# Patient Record
Sex: MALE | Race: BLACK OR AFRICAN AMERICAN | NOT HISPANIC OR LATINO | URBAN - METROPOLITAN AREA
[De-identification: names, ages, dates, MRNs, and addresses within clinical notes are randomized per-mention and may not be internally consistent; named-entity substitution may affect disease eponyms.]

---

## 2020-11-05 ENCOUNTER — EMERGENCY (EMERGENCY)
Facility: HOSPITAL | Age: 65
LOS: 1 days | Discharge: HOME | End: 2020-11-05
Attending: EMERGENCY MEDICINE | Admitting: EMERGENCY MEDICINE
Payer: COMMERCIAL

## 2020-11-05 VITALS
SYSTOLIC BLOOD PRESSURE: 180 MMHG | TEMPERATURE: 99 F | HEART RATE: 69 BPM | DIASTOLIC BLOOD PRESSURE: 68 MMHG | RESPIRATION RATE: 16 BRPM | OXYGEN SATURATION: 100 %

## 2020-11-05 LAB
ALBUMIN SERPL ELPH-MCNC: 4.4 G/DL — SIGNIFICANT CHANGE UP (ref 3.3–5)
ALP SERPL-CCNC: 53 U/L — SIGNIFICANT CHANGE UP (ref 40–120)
ALT FLD-CCNC: 16 U/L — SIGNIFICANT CHANGE UP (ref 4–41)
ANION GAP SERPL CALC-SCNC: 10 MMO/L — SIGNIFICANT CHANGE UP (ref 7–14)
AST SERPL-CCNC: 15 U/L — SIGNIFICANT CHANGE UP (ref 4–40)
BASOPHILS # BLD AUTO: 0.02 K/UL — SIGNIFICANT CHANGE UP (ref 0–0.2)
BASOPHILS NFR BLD AUTO: 0.4 % — SIGNIFICANT CHANGE UP (ref 0–2)
BILIRUB SERPL-MCNC: 0.2 MG/DL — SIGNIFICANT CHANGE UP (ref 0.2–1.2)
BUN SERPL-MCNC: 21 MG/DL — SIGNIFICANT CHANGE UP (ref 7–23)
CALCIUM SERPL-MCNC: 9.9 MG/DL — SIGNIFICANT CHANGE UP (ref 8.4–10.5)
CHLORIDE SERPL-SCNC: 101 MMOL/L — SIGNIFICANT CHANGE UP (ref 98–107)
CO2 SERPL-SCNC: 27 MMOL/L — SIGNIFICANT CHANGE UP (ref 22–31)
CREAT SERPL-MCNC: 1 MG/DL — SIGNIFICANT CHANGE UP (ref 0.5–1.3)
EOSINOPHIL # BLD AUTO: 0.17 K/UL — SIGNIFICANT CHANGE UP (ref 0–0.5)
EOSINOPHIL NFR BLD AUTO: 3.1 % — SIGNIFICANT CHANGE UP (ref 0–6)
GLUCOSE SERPL-MCNC: 256 MG/DL — HIGH (ref 70–99)
HBA1C BLD-MCNC: 10.3 % — HIGH (ref 4–5.6)
HCT VFR BLD CALC: 41 % — SIGNIFICANT CHANGE UP (ref 39–50)
HGB BLD-MCNC: 13.3 G/DL — SIGNIFICANT CHANGE UP (ref 13–17)
IMM GRANULOCYTES NFR BLD AUTO: 0.2 % — SIGNIFICANT CHANGE UP (ref 0–1.5)
LYMPHOCYTES # BLD AUTO: 1.91 K/UL — SIGNIFICANT CHANGE UP (ref 1–3.3)
LYMPHOCYTES # BLD AUTO: 35.1 % — SIGNIFICANT CHANGE UP (ref 13–44)
MCHC RBC-ENTMCNC: 25.9 PG — LOW (ref 27–34)
MCHC RBC-ENTMCNC: 32.4 % — SIGNIFICANT CHANGE UP (ref 32–36)
MCV RBC AUTO: 79.9 FL — LOW (ref 80–100)
MONOCYTES # BLD AUTO: 0.32 K/UL — SIGNIFICANT CHANGE UP (ref 0–0.9)
MONOCYTES NFR BLD AUTO: 5.9 % — SIGNIFICANT CHANGE UP (ref 2–14)
NEUTROPHILS # BLD AUTO: 3.01 K/UL — SIGNIFICANT CHANGE UP (ref 1.8–7.4)
NEUTROPHILS NFR BLD AUTO: 55.3 % — SIGNIFICANT CHANGE UP (ref 43–77)
NRBC # FLD: 0 K/UL — SIGNIFICANT CHANGE UP (ref 0–0)
PLATELET # BLD AUTO: 186 K/UL — SIGNIFICANT CHANGE UP (ref 150–400)
PMV BLD: 11.5 FL — SIGNIFICANT CHANGE UP (ref 7–13)
POTASSIUM SERPL-MCNC: 4.6 MMOL/L — SIGNIFICANT CHANGE UP (ref 3.5–5.3)
POTASSIUM SERPL-SCNC: 4.6 MMOL/L — SIGNIFICANT CHANGE UP (ref 3.5–5.3)
PROT SERPL-MCNC: 7.7 G/DL — SIGNIFICANT CHANGE UP (ref 6–8.3)
RBC # BLD: 5.13 M/UL — SIGNIFICANT CHANGE UP (ref 4.2–5.8)
RBC # FLD: 13.8 % — SIGNIFICANT CHANGE UP (ref 10.3–14.5)
SARS-COV-2 RNA SPEC QL NAA+PROBE: SIGNIFICANT CHANGE UP
SODIUM SERPL-SCNC: 138 MMOL/L — SIGNIFICANT CHANGE UP (ref 135–145)
TROPONIN T, HIGH SENSITIVITY: 14 NG/L — SIGNIFICANT CHANGE UP (ref ?–14)
TROPONIN T, HIGH SENSITIVITY: 14 NG/L — SIGNIFICANT CHANGE UP (ref ?–14)
WBC # BLD: 5.44 K/UL — SIGNIFICANT CHANGE UP (ref 3.8–10.5)
WBC # FLD AUTO: 5.44 K/UL — SIGNIFICANT CHANGE UP (ref 3.8–10.5)

## 2020-11-05 PROCEDURE — 70496 CT ANGIOGRAPHY HEAD: CPT | Mod: 26

## 2020-11-05 PROCEDURE — 70498 CT ANGIOGRAPHY NECK: CPT | Mod: 26

## 2020-11-05 PROCEDURE — 99220: CPT

## 2020-11-05 RX ORDER — SODIUM CHLORIDE 9 MG/ML
1000 INJECTION, SOLUTION INTRAVENOUS
Refills: 0 | Status: DISCONTINUED | OUTPATIENT
Start: 2020-11-05 | End: 2020-11-09

## 2020-11-05 RX ORDER — ASPIRIN/CALCIUM CARB/MAGNESIUM 324 MG
81 TABLET ORAL DAILY
Refills: 0 | Status: DISCONTINUED | OUTPATIENT
Start: 2020-11-05 | End: 2020-11-09

## 2020-11-05 RX ORDER — INSULIN GLARGINE 100 [IU]/ML
20 INJECTION, SOLUTION SUBCUTANEOUS AT BEDTIME
Refills: 0 | Status: DISCONTINUED | OUTPATIENT
Start: 2020-11-06 | End: 2020-11-09

## 2020-11-05 RX ORDER — DEXTROSE 50 % IN WATER 50 %
25 SYRINGE (ML) INTRAVENOUS ONCE
Refills: 0 | Status: DISCONTINUED | OUTPATIENT
Start: 2020-11-05 | End: 2020-11-09

## 2020-11-05 RX ORDER — INSULIN LISPRO 100/ML
VIAL (ML) SUBCUTANEOUS AT BEDTIME
Refills: 0 | Status: DISCONTINUED | OUTPATIENT
Start: 2020-11-06 | End: 2020-11-09

## 2020-11-05 RX ORDER — LISINOPRIL 2.5 MG/1
10 TABLET ORAL AT BEDTIME
Refills: 0 | Status: DISCONTINUED | OUTPATIENT
Start: 2020-11-05 | End: 2020-11-09

## 2020-11-05 RX ORDER — ATENOLOL 25 MG/1
25 TABLET ORAL AT BEDTIME
Refills: 0 | Status: DISCONTINUED | OUTPATIENT
Start: 2020-11-05 | End: 2020-11-09

## 2020-11-05 RX ORDER — INSULIN LISPRO 100/ML
6 VIAL (ML) SUBCUTANEOUS
Refills: 0 | Status: DISCONTINUED | OUTPATIENT
Start: 2020-11-05 | End: 2020-11-09

## 2020-11-05 RX ORDER — DEXTROSE 50 % IN WATER 50 %
12.5 SYRINGE (ML) INTRAVENOUS ONCE
Refills: 0 | Status: DISCONTINUED | OUTPATIENT
Start: 2020-11-05 | End: 2020-11-09

## 2020-11-05 RX ORDER — GLUCAGON INJECTION, SOLUTION 0.5 MG/.1ML
1 INJECTION, SOLUTION SUBCUTANEOUS ONCE
Refills: 0 | Status: DISCONTINUED | OUTPATIENT
Start: 2020-11-05 | End: 2020-11-09

## 2020-11-05 RX ORDER — INSULIN GLARGINE 100 [IU]/ML
20 INJECTION, SOLUTION SUBCUTANEOUS ONCE
Refills: 0 | Status: COMPLETED | OUTPATIENT
Start: 2020-11-05 | End: 2020-11-05

## 2020-11-05 RX ORDER — DEXTROSE 50 % IN WATER 50 %
15 SYRINGE (ML) INTRAVENOUS ONCE
Refills: 0 | Status: DISCONTINUED | OUTPATIENT
Start: 2020-11-05 | End: 2020-11-09

## 2020-11-05 RX ORDER — INSULIN LISPRO 100/ML
VIAL (ML) SUBCUTANEOUS
Refills: 0 | Status: DISCONTINUED | OUTPATIENT
Start: 2020-11-05 | End: 2020-11-09

## 2020-11-05 RX ORDER — ACETAMINOPHEN 500 MG
975 TABLET ORAL ONCE
Refills: 0 | Status: COMPLETED | OUTPATIENT
Start: 2020-11-05 | End: 2020-11-05

## 2020-11-05 RX ADMIN — LISINOPRIL 10 MILLIGRAM(S): 2.5 TABLET ORAL at 22:06

## 2020-11-05 RX ADMIN — ATENOLOL 25 MILLIGRAM(S): 25 TABLET ORAL at 22:06

## 2020-11-05 RX ADMIN — Medication 975 MILLIGRAM(S): at 15:05

## 2020-11-05 NOTE — ED PROVIDER NOTE - OBJECTIVE STATEMENT
66 y/o male with pmhx of DM on insulin, HTN presents to ED c/o headache x 3 days. States he developed a gradual onset left sided throbbing headache 3 days ago that has been constant since, currently 6/10. Associated with a 30 minute episode of "dizziness that felt like I couldn't concentrate," generalized weakness and slurred speech 3 days ago. Denies room spinning. Witnessed by family, called EMS 3 days ago. States did not end up going to hospital because EMS said his ekg was fine. Pt denies fever, cp, sob, n/v, weakness, numbness, tingling, facial droop, ataxia. Pt takes aspirin daily.

## 2020-11-05 NOTE — ED PROVIDER NOTE - ATTENDING CONTRIBUTION TO CARE
I have seen and examined the patient on the patient´s visit date. I have reviewed the note written by Nataly Mendez [facility], on that visit day. I have supervised and participated as necessary in the performance of procedures indicated for patient management and was available at all phases of the patient´s visit when needed. We discussed the history, physical exam findings, mnagement plan, and  medical decision making. I have made my additons, exceptions, and revisions within the chart and I agree with H and P as documented in its entirety. The data and my interpretation of any data collected from labs, interventions and imaging appear below as well as my independent medical decision making and considerations    The patient is a 65y Male who has a past medical and surgery history of HTN Diabetes mellitus and no significant past surgical history PTED with throbbing HA associated with blurred vision and clouded sensorium as described   Vital Signs Last 24 Hrs  T(F): 98.9 HR: 60 BP: 165/73 RR: 18 SpO2: 100% (05 Nov 2020 19:01) (100% - 100%)  PE: as described; my additions and exceptions are noted in the chart  DATA:  EKG: See above;  LAB:                        13.3   5.44  )-----------( 186      ( 05 Nov 2020 15:00 )             41.0   Mean Cell Volume: 79.9 fL (11-05-20 @ 15:00)  Auto Neutrophil %: 55.3 % (11-05-20 @ 15:00)  Auto Eosinophil %: 3.1 % (11-05-20 @ 15:00)  11-05    138  |  101  |  21  ----------------------------<  256<H>  4.6   |  27  |  1.00    Ca    9.9      05 Nov 2020 15:00    TPro  7.7  /  Alb  4.4  /  TBili  0.2  /  DBili  x   /  AST  15  /  ALT  16  /  AlkPhos  53  11-05      Troponin T, High Sensitivity: 14 ng/L (11-05-20 @ 16:19)    IMAGING: CT of the head demonstrates that the ventricles and sulci to be normal in appearance. No intracranial mass effect or hemorrhage is seen. No areas of abnormal attenuation or abnormal enhancement are seen.  No occlusion, significant stenosis or other vascular abnormality identified.    IMPRESSION/RISK:  Dx=CVA vs anxiety  Plan  Neuro consult read/appreciated  - as CTA negative will d/c with ASA as recommended to f/u with Vascular Neurology, Dr. Marquez at [phone]  RTEDPRN I have seen and examined the patient on the patient´s visit date. I have reviewed the note written by Nataly Mendez [facility], on that visit day. I have supervised and participated as necessary in the performance of procedures indicated for patient management and was available at all phases of the patient´s visit when needed. We discussed the history, physical exam findings, mnagement plan, and  medical decision making. I have made my additons, exceptions, and revisions within the chart and I agree with H and P as documented in its entirety. The data and my interpretation of any data collected from labs, interventions and imaging appear below as well as my independent medical decision making and considerations    The patient is a 65y Male who has a past medical and surgery history of HTN Diabetes mellitus and no significant past surgical history PTED with throbbing HA associated with blurred vision and clouded sensorium as described   Vital Signs Last 24 Hrs  T(F): 98.9 HR: 60 BP: 165/73 RR: 18 SpO2: 100% (05 Nov 2020 19:01) (100% - 100%)  PE: as described; my additions and exceptions are noted in the chart  DATA:  EKG: See above;  LAB:                        13.3   5.44  )-----------( 186      ( 05 Nov 2020 15:00 )             41.0   Mean Cell Volume: 79.9 fL (11-05-20 @ 15:00)  Auto Neutrophil %: 55.3 % (11-05-20 @ 15:00)  Auto Eosinophil %: 3.1 % (11-05-20 @ 15:00)  11-05    138  |  101  |  21  ----------------------------<  256<H>  4.6   |  27  |  1.00    Ca    9.9      05 Nov 2020 15:00    TPro  7.7  /  Alb  4.4  /  TBili  0.2  /  DBili  x   /  AST  15  /  ALT  16  /  AlkPhos  53  11-05      Troponin T, High Sensitivity: 14 ng/L (11-05-20 @ 16:19)    IMAGING: CT of the head demonstrates that the ventricles and sulci to be normal in appearance. No intracranial mass effect or hemorrhage is seen. No areas of abnormal attenuation or abnormal enhancement are seen.  No occlusion, significant stenosis or other vascular abnormality identified.    IMPRESSION/RISK:  Dx=CVA vs anxiety  Plan  Neuro consult read/appreciated  - as CTA negative will d/c with ASA as recommended to f/u with Vascular Neurology, Dr. Marquez at [phone] if improved with instructions to   RTEDPRN  Otherwise will hold for MRI/a I have seen and examined the patient on the patient´s visit date. I have reviewed the note written by Nataly Mendez [facility], on that visit day. I have supervised and participated as necessary in the performance of procedures indicated for patient management and was available at all phases of the patient´s visit when needed. We discussed the history, physical exam findings, management plan, and  medical decision making. I have made my additions, exceptions, and revisions within the chart and I agree with H and P as documented in its entirety. The data and my interpretation of any data collected from labs, interventions and imaging appear below as well as my independent medical decision making and considerations    The patient is a 65y Male who has a past medical and surgery history of HTN Diabetes mellitus and no significant past surgical history PTED with throbbing HA associated with blurred vision and clouded sensorium as described   Vital Signs Last 24 Hrs  T(F): 98.9 HR: 60 BP: 165/73 RR: 18 SpO2: 100% (05 Nov 2020 19:01) (100% - 100%)  PE: as described; my additions and exceptions are noted in the chart  DATA:  EKG: See above;  LAB: See relevant/abnormal labs in pullset                        13.3   5.44  )-----------( 186      ( 05 Nov 2020 15:00 )             41.0   Mean Cell Volume: 79.9 fL (11-05-20 @ 15:00)  Auto Neutrophil %: 55.3 % (11-05-20 @ 15:00)  Auto Eosinophil %: 3.1 % (11-05-20 @ 15:00)  11-05    138  |  101  |  21  ----------------------------<  256<H>  4.6   |  27  |  1.00    Ca    9.9      05 Nov 2020 15:00    TPro  7.7  /  Alb  4.4  /  TBili  0.2  /  DBili  x   /  AST  15  /  ALT  16  /  AlkPhos  53  11-05      Troponin T, High Sensitivity: 14 ng/L (11-05-20 @ 16:19)    IMAGING: CT of the head demonstrates that the ventricles and sulci to be normal in appearance. No intracranial mass effect or hemorrhage is seen. No areas of abnormal attenuation or abnormal enhancement are seen.  No occlusion, significant stenosis or other vascular abnormality identified.    IMPRESSION/RISK:  Dx=CVA vs anxiety  Plan  Neuro consult read/appreciated  - as CTA negative will d/c with ASA as recommended to f/u with Vascular Neurology, Dr. Marquez at [phone] if improved with instructions to   RTEDPRN  Otherwise will hold for MRI/a

## 2020-11-05 NOTE — ED CDU PROVIDER INITIAL DAY NOTE - MEDICAL DECISION MAKING DETAILS
Tele monitoring, neuro checks, MRI head, supportive care, general observation care / monitoring; Neuro team is following patient.

## 2020-11-05 NOTE — ED CDU PROVIDER INITIAL DAY NOTE - ATTENDING CONTRIBUTION TO CARE
I have seen and examined the patient on the patient´s visit date. I have reviewed the note written by Brissa Galvez MD on that visit day. I have supervised and participated as necessary in the performance of procedures indicated for patient management and was available at all phases of the patient´s visit when needed. We discussed the history, physical exam findings, mnagement plan, and  medical decision making. I have made my additons, exceptions, and revisions within the chart and I agree with H and P as documented in its entirety. The data and my interpretation of any data collected from labs, interventions and imaging appear below as well as my independent medical decision making and considerations    The patient is a 65y Male who has a past medical and surgery history of  Hyperlipidemia Diabetes mellitus Hypertension PTED with left sided headache and transient cognitive dysfunction requiring futther diagnostic workup as outlined above

## 2020-11-05 NOTE — ED CDU PROVIDER INITIAL DAY NOTE - PHYSICAL EXAMINATION
CONSTITUTIONAL:  Well appearing, awake, alert, oriented to person, place, time/situation and in no apparent distress.  Pt. is objectively comfortable appearing and verbalizing in full, clear, effortless sentences.  ENMT: NC/AT.  Airway patent.  Nasal mucosa clear.  Moist mucous membranes.  Neck supple.  EYES:  Clear OU.  CARDIAC:  Normal rate, regular rhythm.  Heart sounds S1 S2.  No murmurs, gallops, or rubs.  RESPIRATORY:  Breath sounds clear and equal bilaterally.  No wheezes, no rales, no rhonchi.  GASTROINTESTINAL:  Abdomen soft, non-distended, non-tender.  No rebound, no guarding.  NEUROLOGICAL:  Alert and oriented to person/place/time/situation.  No focal deficits; no motor deficits.  No tremors noted.  Gait is stable, unassisted.  MUSCULOSKELETAL:  Range of motion is not limited.    SKIN:  Skin color unremarkable.  Skin warm, dry, and intact.    PSYCHIATRIC:  Alert and oriented to person/place/time/situation.  Mood and affect WNL.  No apparent risk to self or others.

## 2020-11-05 NOTE — ED ADULT NURSE NOTE - OBJECTIVE STATEMENT
PT received AOx4, ambulatory at baseline presents to the ED with chief complaint of headache, dizziness, and lethargy that started 2 days ago. PT states that he came home from work was sitting in front of his computer when all of a sudden he felt dizzy. PT denies trauma to the head. Pt denies falling. PT states at the moment, the dizziness has resolved but he still has 3-4/10 headache mostly located in the back of the head. Pt denies CP, SOB, NVD, dysuria, chills, fever, cough. Breathing even and unlabored. Skin intact. Medicated per MD order. Labs sent.

## 2020-11-05 NOTE — ED PROVIDER NOTE - PROGRESS NOTE DETAILS
FAYE Mendez- called neurology, states in code stroke asking for me to callback FAYE Mendez- spoke with neuro resident, states will not see patient until cta is performed. I expressed my clinical concern for cva and currently refusing to see patient. FAYE Mendez- spoke with neuro resident, states will not see patient until cta is performed. I expressed my clinical concern for cva. FAYE Mendez- pt accepted to cdu

## 2020-11-05 NOTE — CONSULT NOTE ADULT - SUBJECTIVE AND OBJECTIVE BOX
HPI: 65 year old female, PMH DM, HTN, presenting for persistent HA for 3 days duration. Starting 3 days ago while sitting down and working on his computer, patient started feeling dizzy (non-room spinning), slurred speech, and a HA in the L parietal and frontal regions. The dizziness and slurred speech, according to the patient lasted 2-3 hours before resolving. The patient still had the HA symptoms, which was a 9/10 in intensity. Upon on my encounter, patient states his HA is at a 2/10. The HA is not a/w photophobia or worsening with position. Denies watery eyes or runny nose. Denies ringing/fullness in the ears, trouble w/ speech/swallowing, numbness/tingling/weakness. No h/o migraines. Not on AC or DAPT. Patient is only on medications for HTN and DM. Of note, when the symptoms started 3 days ago, EMS was called to evaluate him but did not feel he needed to go to the ED. NIH: 0, MRS: 0    REVIEW OF SYSTEMS  As per HPI    PAST MEDICAL & SURGICAL HISTORY:  Diabetes mellitus    Hypertension    No significant past surgical history      FAMILY HISTORY:    SOCIAL HISTORY:   T/E/D:   Occupation:   Lives with:     MEDICATIONS (HOME):  Home Medications:    MEDICATIONS  (STANDING):    MEDICATIONS  (PRN):    ALLERGIES/INTOLERANCES:  Allergies  Cipro (Rash)    Intolerances    VITALS & EXAMINATION:  Vital Signs Last 24 Hrs  T(C): 37 (05 Nov 2020 13:32), Max: 37 (05 Nov 2020 13:32)  T(F): 98.6 (05 Nov 2020 13:32), Max: 98.6 (05 Nov 2020 13:32)  HR: 69 (05 Nov 2020 13:32) (69 - 69)  BP: 180/68 (05 Nov 2020 13:32) (180/68 - 180/68)  BP(mean): --  RR: 16 (05 Nov 2020 13:32) (16 - 16)  SpO2: 100% (05 Nov 2020 13:32) (100% - 100%)    General:  Constitutional: Male, appears stated age, in no apparent distress including pain  Head: Normocephalic & atraumatic.    Neurological (>12):  MS: Awake, alert, oriented to person, place, situation, time. Normal affect. Follows all commands.    Language: Speech is clear, fluent with good repetition & comprehension    CNs: PERRLA (R = 3mm, L = 3mm). VFF. EOMI no nystagmus, no diplopia. V1-3 intact to LT, well developed masseter muscles b/l. No facial asymmetry b/l, full eye closure strength b/l. Hearing grossly normal (rubbing fingers) b/l. Symmetric palate elevation in midline. Gag reflex deferred. Head turning & shoulder shrug intact b/l. Tongue midline, normal movements, no atrophy.    Motor: Normal muscle bulk & tone. No noticeable tremor or seizure. No pronator drift.    Sensation: Intact to LT b/l throughout.     Cortical: Extinction on DSS (neglect): none    Reflexes:              Patellar(L4)    Achilles(S1)    Plantar Resp  R	2		    2		Down   L          2		    2		Down     Coordination: No dysmetria to FTN    Gait: No postural instability. Normal stance and tandem gait.     LABORATORY:  CBC                       13.3   5.44  )-----------( 186      ( 05 Nov 2020 15:00 )             41.0     Chem 11-05    138  |  101  |  21  ----------------------------<  256<H>  4.6   |  27  |  1.00    Ca    9.9      05 Nov 2020 15:00    TPro  7.7  /  Alb  4.4  /  TBili  0.2  /  DBili  x   /  AST  15  /  ALT  16  /  AlkPhos  53  11-05    LFTs LIVER FUNCTIONS - ( 05 Nov 2020 15:00 )  Alb: 4.4 g/dL / Pro: 7.7 g/dL / ALK PHOS: 53 u/L / ALT: 16 u/L / AST: 15 u/L / GGT: x           Coagulopathy   Lipid Panel   A1c   Cardiac enzymes     U/A   CSF  Immunological  Other    STUDIES & IMAGING:  Studies (EKG, EEG, EMG, etc):     Radiology (XR, CT, MR, U/S, TTE/QUIRINO):  Imaging pending

## 2020-11-05 NOTE — ED ADULT TRIAGE NOTE - CHIEF COMPLAINT QUOTE
C/o posterior HA x2 days. Reports additional episode of dizziness 2 days ago which resolved, evaluated by EMS at that time and told VS and EKG nml. Denies CP/ SOB/ cough/f/c/N/V/D. PMH HTN DMII.

## 2020-11-05 NOTE — ED PROVIDER NOTE - CLINICAL SUMMARY MEDICAL DECISION MAKING FREE TEXT BOX
64 y/o male with pmhx of DM on insulin, HTN presents to ED c/o headache x 3 days. States he developed a gradual onset left sided throbbing headache 3 days ago that has been constant since, currently 6/10. Associated with a 30 minute episode of "dizziness that felt like I couldn't concentrate," generalized weakness and slurred speech 3 days ago. On aspirin daily. pt well appearing, nad, no neuro deficits. concern for CVA vs TIA. plan to check cta head and neck. amenable for cdu stay for MRI and neuro.

## 2020-11-05 NOTE — ED PROVIDER NOTE - CARE PROVIDER_API CALL
Sawyer Marquez  NEUROLOGY  [address] [facility] [address] [facility] [address]  [place] [address]  Phone: [phone]  Fax: [phone]  Established Patient  Follow Up Time:

## 2020-11-05 NOTE — ED PROVIDER NOTE - CARE PLAN
Principal Discharge DX:	Other vascular headache  Secondary Diagnosis:	Type 2 diabetes mellitus without complication, unspecified whether long term insulin use  Secondary Diagnosis:	Essential hypertension

## 2020-11-05 NOTE — ED CDU PROVIDER INITIAL DAY NOTE - INDICATION FOR OBSERVATION
Tele monitoring, neuro checks, MRI head, supportive care, general observation care / monitoring; Neuro team is following patient./Diagnostic Uncertainty/Other

## 2020-11-05 NOTE — CONSULT NOTE ADULT - ASSESSMENT
Impression: 65 year old female, PMH DM, HTN, presenting for persistent HA for 3 days duration. Starting 3 days ago while sitting down and working on his computer, patient started feeling dizzy (non-room spinning), slurred speech, and a HA in the L parietal and frontal regions. The dizziness and slurred speech, according to the patient lasted 2-3 hours before resolving. Headache also with transient dizziness and slurred speech, now resolved of unclear etiology - possibly in setting of hypertensive emergency vs TIA vs minor stroke     Recommend:  - will f/u CTH and CTA H/N  - if imaging is negative for acute bleed would start Aspirin 81mg daily if no contraindications  - would defer Plavix at this time given symptom onset > 3d   - if headache improves, can follow up outpatient with Vascular Neurology, Dr. Marquez at [phone]

## 2020-11-06 VITALS
SYSTOLIC BLOOD PRESSURE: 149 MMHG | RESPIRATION RATE: 14 BRPM | DIASTOLIC BLOOD PRESSURE: 70 MMHG | HEART RATE: 59 BPM | TEMPERATURE: 97 F | OXYGEN SATURATION: 98 %

## 2020-11-06 LAB
CHOLEST SERPL-MCNC: 228 MG/DL — HIGH (ref 120–199)
DIRECT LDL: 185 MG/DL — SIGNIFICANT CHANGE UP
HDLC SERPL-MCNC: 42 MG/DL — SIGNIFICANT CHANGE UP (ref 35–55)
TRIGL SERPL-MCNC: 70 MG/DL — SIGNIFICANT CHANGE UP (ref 10–149)

## 2020-11-06 PROCEDURE — 70551 MRI BRAIN STEM W/O DYE: CPT | Mod: 26

## 2020-11-06 PROCEDURE — 99217: CPT

## 2020-11-06 RX ORDER — CLOPIDOGREL BISULFATE 75 MG/1
1 TABLET, FILM COATED ORAL
Qty: 30 | Refills: 1
Start: 2020-11-06 | End: 2021-01-04

## 2020-11-06 RX ORDER — CLOPIDOGREL BISULFATE 75 MG/1
300 TABLET, FILM COATED ORAL ONCE
Refills: 0 | Status: COMPLETED | OUTPATIENT
Start: 2020-11-06 | End: 2020-11-06

## 2020-11-06 RX ORDER — ASPIRIN/CALCIUM CARB/MAGNESIUM 324 MG
1 TABLET ORAL
Qty: 30 | Refills: 1
Start: 2020-11-06 | End: 2021-01-04

## 2020-11-06 RX ADMIN — Medication 81 MILLIGRAM(S): at 09:45

## 2020-11-06 RX ADMIN — INSULIN GLARGINE 20 UNIT(S): 100 INJECTION, SOLUTION SUBCUTANEOUS at 00:01

## 2020-11-06 RX ADMIN — Medication 1: at 12:51

## 2020-11-06 RX ADMIN — Medication 6 UNIT(S): at 09:46

## 2020-11-06 RX ADMIN — CLOPIDOGREL BISULFATE 300 MILLIGRAM(S): 75 TABLET, FILM COATED ORAL at 12:55

## 2020-11-06 RX ADMIN — Medication 6 UNIT(S): at 12:51

## 2020-11-06 NOTE — ED CDU PROVIDER SUBSEQUENT DAY NOTE - ATTENDING CONTRIBUTION TO CARE
See History of Present Illness for attending note. See History of Present Illness for attending note..

## 2020-11-06 NOTE — ED CDU PROVIDER DISPOSITION NOTE - NSFOLLOWUPINSTRUCTIONS_ED_ALL_ED_FT
You came to the ED with left-sided headaches x 3 days with dizziness and slurred speech, which resolved after a few hours.  We were concerned for a stroke so we performed at CT angiogram of your head and neck which did not show any acute findings. We also performed a MRI of the brain which also did not show any acute findings.  Neurology is recommending you start aspirin and plavix. Both of these medications decreased your platlets from forming but also increase your risk for bleeding.  Please follow up with your primary care physician.  Please also follow up with Vascular Neurology, Dr. Marquez at [phone]. Call to arrange an appointment.    Advance activity as tolerated.  Continue all previously prescribed medications as directed unless otherwise instructed.  Follow up with your primary care physician in 48-72 hours- bring copies of your results.  Return to the ER for worsening or persistent symptoms, and/or ANY NEW OR CONCERNING SYMPTOMS. If you have issues obtaining follow up, please call: [phone] to obtain a doctor or specialist who takes your insurance in your area.  You may call [phone] to make an appointment with the internal medicine clinic.

## 2020-11-06 NOTE — ED CDU PROVIDER SUBSEQUENT DAY NOTE - HISTORY
66 y/o M PMH DM, HTN, HLD presented to the ED c/o left-sided headaches x 3 days with dizziness and slurred speech, which resolved after a few hours. Neurology was consulted and CTA head/neck were performed with no acute findings noted.  Pt. was dispo'd to CDU for MRI head. Pt well appearing, without complaints, VSS, NAD

## 2020-11-06 NOTE — ED CDU PROVIDER DISPOSITION NOTE - CLINICAL COURSE
64 yo male, PMH DM, HTN, hyperlipidemia; pt presented to the ED c/o left-sided headaches x 3 days with dizziness and slurred speech, which resolved after a few hours. Neurology was consulted and CTA head/neck were performed with no acute findings noted.  Pt. was dispo'd to CDU for MRI head which did not show any acute findings. Pt well appearing, without complaints, VSS, NAD.

## 2020-11-06 NOTE — ED CDU PROVIDER DISPOSITION NOTE - PATIENT PORTAL LINK FT
You can access the FollowMyHealth Patient Portal offered by [facility] by registering at the following website: [email or web address]. By joining [facility]’s FollowMyHealth portal, you will also be able to view your health information using other applications (apps) compatible with our system.

## 2020-11-06 NOTE — ED CDU PROVIDER SUBSEQUENT DAY NOTE - MEDICAL DECISION MAKING DETAILS
MRI results pending today. 66 y/o M PMH DM, HTN, HLD presented to the ED c/o left-sided headaches x 3 days with dizziness and slurred speech, which resolved after a few hours. Neurology was consulted and CTA head/neck were performed with no acute findings noted.  Pt. was dispo'd to CDU for MRI head. Pt well appearing, without complaints, VSS, NAD  MRI results pending today.

## 2020-12-25 NOTE — ED CDU PROVIDER INITIAL DAY NOTE - OBJECTIVE STATEMENT
64 y/o male with pmhx of DM on insulin, HTN presents to ED c/o headache x 3 days. States he developed a gradual onset left sided throbbing headache 3 days ago that has been constant since, currently 6/10. Associated with a 30 minute episode of "dizziness that felt like I couldn't concentrate," generalized weakness and slurred speech 3 days ago. Denies room spinning. Witnessed by family, called EMS 3 days ago. States did not end up going to hospital because EMS said his ekg was fine. Pt denies fever, cp, sob, n/v, weakness, numbness, tingling, facial droop, ataxia. Pt takes aspirin daily.    CDU FAYE Galvez Note-----  ED Provider HPI as above, reviewe.  64 yo male, PMH DM, HTN, hyperlipidemia; pt presented to the ED c/o left-sided headaches x 3 days; pt stated at onset, headache was associated with dizziness and slurred speech, which resolved after a few hours; headache has been persistent since.  In the ED, CBC/CMP grossly unremarkable aside from serum glucose 256, trop 14 ---> 14.  Neurology was consulted and CTA head/neck were performed with no acute findings noted.  Pt. was dispo'd to CDU for continued care plan:  Tele monitoring, neuro checks, MRI head, supportive care, general observation care / monitoring; Neuro team is following patient.  On CDU evaluation, pt denies any active c/o.  Pt denies active headache, vision disturbances, dizziness, weakness (generalized or focal); pt states no other c/o.  CDU care plan d/w pt who verbalizes agreement with plan.
None known

## 2021-10-22 NOTE — ED ADULT NURSE NOTE - PAIN: PRESENCE, MLM
Patient declines to make appointment at this time.  She states she will wait to see specialist.     complains of pain/discomfort

## 2022-06-24 ENCOUNTER — INPATIENT (INPATIENT)
Facility: HOSPITAL | Age: 67
LOS: 2 days | Discharge: HOME | End: 2022-06-27
Attending: INTERNAL MEDICINE | Admitting: INTERNAL MEDICINE
Payer: COMMERCIAL

## 2022-06-24 VITALS
RESPIRATION RATE: 18 BRPM | TEMPERATURE: 98 F | SYSTOLIC BLOOD PRESSURE: 175 MMHG | HEART RATE: 68 BPM | OXYGEN SATURATION: 99 % | DIASTOLIC BLOOD PRESSURE: 82 MMHG

## 2022-06-24 DIAGNOSIS — I10 ESSENTIAL (PRIMARY) HYPERTENSION: ICD-10-CM

## 2022-06-24 DIAGNOSIS — E11.65 TYPE 2 DIABETES MELLITUS WITH HYPERGLYCEMIA: ICD-10-CM

## 2022-06-24 DIAGNOSIS — R07.9 CHEST PAIN, UNSPECIFIED: ICD-10-CM

## 2022-06-24 DIAGNOSIS — Z29.9 ENCOUNTER FOR PROPHYLACTIC MEASURES, UNSPECIFIED: ICD-10-CM

## 2022-06-24 PROBLEM — E78.5 HYPERLIPIDEMIA, UNSPECIFIED: Chronic | Status: ACTIVE | Noted: 2020-11-05

## 2022-06-24 LAB
ALBUMIN SERPL ELPH-MCNC: 4.5 G/DL — SIGNIFICANT CHANGE UP (ref 3.3–5)
ALP SERPL-CCNC: 50 U/L — SIGNIFICANT CHANGE UP (ref 40–120)
ALT FLD-CCNC: 18 U/L — SIGNIFICANT CHANGE UP (ref 4–41)
ANION GAP SERPL CALC-SCNC: 11 MMOL/L — SIGNIFICANT CHANGE UP (ref 7–14)
AST SERPL-CCNC: 25 U/L — SIGNIFICANT CHANGE UP (ref 4–40)
BASOPHILS # BLD AUTO: 0.02 K/UL — SIGNIFICANT CHANGE UP (ref 0–0.2)
BASOPHILS NFR BLD AUTO: 0.5 % — SIGNIFICANT CHANGE UP (ref 0–2)
BILIRUB SERPL-MCNC: 0.3 MG/DL — SIGNIFICANT CHANGE UP (ref 0.2–1.2)
BUN SERPL-MCNC: 21 MG/DL — SIGNIFICANT CHANGE UP (ref 7–23)
CALCIUM SERPL-MCNC: 9.2 MG/DL — SIGNIFICANT CHANGE UP (ref 8.4–10.5)
CHLORIDE SERPL-SCNC: 103 MMOL/L — SIGNIFICANT CHANGE UP (ref 98–107)
CO2 SERPL-SCNC: 29 MMOL/L — SIGNIFICANT CHANGE UP (ref 22–31)
CREAT SERPL-MCNC: 1.07 MG/DL — SIGNIFICANT CHANGE UP (ref 0.5–1.3)
EGFR: 77 ML/MIN/1.73M2 — SIGNIFICANT CHANGE UP
EOSINOPHIL # BLD AUTO: 0.14 K/UL — SIGNIFICANT CHANGE UP (ref 0–0.5)
EOSINOPHIL NFR BLD AUTO: 3.2 % — SIGNIFICANT CHANGE UP (ref 0–6)
FLUAV AG NPH QL: SIGNIFICANT CHANGE UP
FLUBV AG NPH QL: SIGNIFICANT CHANGE UP
GLUCOSE SERPL-MCNC: 189 MG/DL — HIGH (ref 70–99)
HCT VFR BLD CALC: 41.2 % — SIGNIFICANT CHANGE UP (ref 39–50)
HGB BLD-MCNC: 12.8 G/DL — LOW (ref 13–17)
IANC: 2.06 K/UL — SIGNIFICANT CHANGE UP (ref 1.8–7.4)
IMM GRANULOCYTES NFR BLD AUTO: 0.2 % — SIGNIFICANT CHANGE UP (ref 0–1.5)
LYMPHOCYTES # BLD AUTO: 1.75 K/UL — SIGNIFICANT CHANGE UP (ref 1–3.3)
LYMPHOCYTES # BLD AUTO: 40.3 % — SIGNIFICANT CHANGE UP (ref 13–44)
MAGNESIUM SERPL-MCNC: 1.9 MG/DL — SIGNIFICANT CHANGE UP (ref 1.6–2.6)
MCHC RBC-ENTMCNC: 26.3 PG — LOW (ref 27–34)
MCHC RBC-ENTMCNC: 31.1 GM/DL — LOW (ref 32–36)
MCV RBC AUTO: 84.6 FL — SIGNIFICANT CHANGE UP (ref 80–100)
MONOCYTES # BLD AUTO: 0.36 K/UL — SIGNIFICANT CHANGE UP (ref 0–0.9)
MONOCYTES NFR BLD AUTO: 8.3 % — SIGNIFICANT CHANGE UP (ref 2–14)
NEUTROPHILS # BLD AUTO: 2.06 K/UL — SIGNIFICANT CHANGE UP (ref 1.8–7.4)
NEUTROPHILS NFR BLD AUTO: 47.5 % — SIGNIFICANT CHANGE UP (ref 43–77)
NRBC # BLD: 0 /100 WBCS — SIGNIFICANT CHANGE UP
NRBC # FLD: 0 K/UL — SIGNIFICANT CHANGE UP
PLATELET # BLD AUTO: 160 K/UL — SIGNIFICANT CHANGE UP (ref 150–400)
POTASSIUM SERPL-MCNC: 5.3 MMOL/L — SIGNIFICANT CHANGE UP (ref 3.5–5.3)
POTASSIUM SERPL-SCNC: 5.3 MMOL/L — SIGNIFICANT CHANGE UP (ref 3.5–5.3)
PROT SERPL-MCNC: 7.4 G/DL — SIGNIFICANT CHANGE UP (ref 6–8.3)
RBC # BLD: 4.87 M/UL — SIGNIFICANT CHANGE UP (ref 4.2–5.8)
RBC # FLD: 13.7 % — SIGNIFICANT CHANGE UP (ref 10.3–14.5)
RSV RNA NPH QL NAA+NON-PROBE: SIGNIFICANT CHANGE UP
SARS-COV-2 RNA SPEC QL NAA+PROBE: SIGNIFICANT CHANGE UP
SODIUM SERPL-SCNC: 143 MMOL/L — SIGNIFICANT CHANGE UP (ref 135–145)
TROPONIN T, HIGH SENSITIVITY RESULT: 12 NG/L — SIGNIFICANT CHANGE UP
TROPONIN T, HIGH SENSITIVITY RESULT: 14 NG/L — SIGNIFICANT CHANGE UP
WBC # BLD: 4.34 K/UL — SIGNIFICANT CHANGE UP (ref 3.8–10.5)
WBC # FLD AUTO: 4.34 K/UL — SIGNIFICANT CHANGE UP (ref 3.8–10.5)

## 2022-06-24 PROCEDURE — 93010 ELECTROCARDIOGRAM REPORT: CPT

## 2022-06-24 PROCEDURE — 99284 EMERGENCY DEPT VISIT MOD MDM: CPT | Mod: 25

## 2022-06-24 PROCEDURE — 71046 X-RAY EXAM CHEST 2 VIEWS: CPT | Mod: 26

## 2022-06-24 PROCEDURE — 99223 1ST HOSP IP/OBS HIGH 75: CPT

## 2022-06-24 RX ORDER — HUMAN INSULIN 100 [IU]/ML
0 INJECTION, SUSPENSION SUBCUTANEOUS
Qty: 0 | Refills: 0 | DISCHARGE

## 2022-06-24 RX ORDER — LISINOPRIL 2.5 MG/1
1 TABLET ORAL
Qty: 0 | Refills: 0 | DISCHARGE

## 2022-06-24 RX ORDER — IBUPROFEN 200 MG
400 TABLET ORAL ONCE
Refills: 0 | Status: COMPLETED | OUTPATIENT
Start: 2022-06-24 | End: 2022-06-24

## 2022-06-24 RX ORDER — INSULIN GLARGINE 100 [IU]/ML
20 INJECTION, SOLUTION SUBCUTANEOUS ONCE
Refills: 0 | Status: COMPLETED | OUTPATIENT
Start: 2022-06-24 | End: 2022-06-24

## 2022-06-24 RX ORDER — ATENOLOL 25 MG/1
1 TABLET ORAL
Qty: 0 | Refills: 0 | DISCHARGE

## 2022-06-24 RX ORDER — INSULIN LISPRO 100/ML
VIAL (ML) SUBCUTANEOUS AT BEDTIME
Refills: 0 | Status: DISCONTINUED | OUTPATIENT
Start: 2022-06-24 | End: 2022-06-27

## 2022-06-24 RX ORDER — ENOXAPARIN SODIUM 100 MG/ML
40 INJECTION SUBCUTANEOUS EVERY 24 HOURS
Refills: 0 | Status: DISCONTINUED | OUTPATIENT
Start: 2022-06-24 | End: 2022-06-27

## 2022-06-24 RX ORDER — DEXTROSE 50 % IN WATER 50 %
25 SYRINGE (ML) INTRAVENOUS ONCE
Refills: 0 | Status: DISCONTINUED | OUTPATIENT
Start: 2022-06-24 | End: 2022-06-27

## 2022-06-24 RX ORDER — INSULIN LISPRO 100/ML
4 VIAL (ML) SUBCUTANEOUS
Refills: 0 | Status: DISCONTINUED | OUTPATIENT
Start: 2022-06-24 | End: 2022-06-25

## 2022-06-24 RX ORDER — INSULIN LISPRO 100/ML
VIAL (ML) SUBCUTANEOUS
Refills: 0 | Status: DISCONTINUED | OUTPATIENT
Start: 2022-06-24 | End: 2022-06-27

## 2022-06-24 RX ORDER — INSULIN GLARGINE 100 [IU]/ML
20 INJECTION, SOLUTION SUBCUTANEOUS AT BEDTIME
Refills: 0 | Status: DISCONTINUED | OUTPATIENT
Start: 2022-06-25 | End: 2022-06-27

## 2022-06-24 RX ORDER — LIDOCAINE 4 G/100G
1 CREAM TOPICAL ONCE
Refills: 0 | Status: COMPLETED | OUTPATIENT
Start: 2022-06-24 | End: 2022-06-25

## 2022-06-24 RX ORDER — ATENOLOL 25 MG/1
25 TABLET ORAL AT BEDTIME
Refills: 0 | Status: DISCONTINUED | OUTPATIENT
Start: 2022-06-24 | End: 2022-06-27

## 2022-06-24 RX ORDER — INSULIN LISPRO 100/ML
10 VIAL (ML) SUBCUTANEOUS
Qty: 0 | Refills: 0 | DISCHARGE

## 2022-06-24 RX ORDER — SODIUM CHLORIDE 9 MG/ML
1000 INJECTION, SOLUTION INTRAVENOUS
Refills: 0 | Status: DISCONTINUED | OUTPATIENT
Start: 2022-06-24 | End: 2022-06-27

## 2022-06-24 RX ORDER — DEXTROSE 50 % IN WATER 50 %
12.5 SYRINGE (ML) INTRAVENOUS ONCE
Refills: 0 | Status: DISCONTINUED | OUTPATIENT
Start: 2022-06-24 | End: 2022-06-27

## 2022-06-24 RX ORDER — ASPIRIN/CALCIUM CARB/MAGNESIUM 324 MG
81 TABLET ORAL DAILY
Refills: 0 | Status: DISCONTINUED | OUTPATIENT
Start: 2022-06-25 | End: 2022-06-27

## 2022-06-24 RX ORDER — LISINOPRIL 2.5 MG/1
5 TABLET ORAL DAILY
Refills: 0 | Status: DISCONTINUED | OUTPATIENT
Start: 2022-06-24 | End: 2022-06-27

## 2022-06-24 RX ORDER — DEXTROSE 50 % IN WATER 50 %
15 SYRINGE (ML) INTRAVENOUS ONCE
Refills: 0 | Status: DISCONTINUED | OUTPATIENT
Start: 2022-06-24 | End: 2022-06-27

## 2022-06-24 RX ORDER — GLUCAGON INJECTION, SOLUTION 0.5 MG/.1ML
1 INJECTION, SOLUTION SUBCUTANEOUS ONCE
Refills: 0 | Status: DISCONTINUED | OUTPATIENT
Start: 2022-06-24 | End: 2022-06-27

## 2022-06-24 RX ORDER — ACETAMINOPHEN 500 MG
650 TABLET ORAL EVERY 6 HOURS
Refills: 0 | Status: DISCONTINUED | OUTPATIENT
Start: 2022-06-24 | End: 2022-06-27

## 2022-06-24 RX ADMIN — Medication 400 MILLIGRAM(S): at 14:11

## 2022-06-24 NOTE — ED PROVIDER NOTE - PROGRESS NOTE DETAILS
Jesus Preston PGY2: Work up w/o acute findings thus far. Will admit for further management. Pt accepted to Dr. Zambrano.

## 2022-06-24 NOTE — H&P ADULT - PROBLEM SELECTOR PLAN 3
-On NPH 15-20u BID - per pharmacy this converts to 24-32u of lantus  -Will start conservatively with lantus 20u qhs and uptitrate as needed  -on admelog 10u with breakfast and dinner depending on FSG. Will do admelog 4u TID  -ISS  -add on A1C

## 2022-06-24 NOTE — H&P ADULT - HISTORY OF PRESENT ILLNESS
66M with PMHx DM on insulin, HTN, GERD presenting with left sided chest pain x3 days. Pain is 5/10, nonradiating and not associated with exertion. Pain is worsened with left arm raise. He does not recall any specific injury or trauma. He does not lift weights. He feels as though his left chest wall is tender. No rash or skin changes, cuts. He denies hx cardiac disease, MI, stents. He denies fevers, chills, cough, SOB, abdominal pain, N/V/D, LH, palpitations, LOC.    In ED EKG with sinus bradycardia @58bpm with sinus arrhythmia  Troponins 12--14

## 2022-06-24 NOTE — ED PROVIDER NOTE - EKG ADDITIONAL INFORMATION FREE TEXT
RR:62  Intervals normal  No JANAY or ST deviations  Potentially PACs RR:62  Intervals normal  No JANAY or ST deviations  2:1 Complete heart block.

## 2022-06-24 NOTE — ED PROVIDER NOTE - ATTENDING CONTRIBUTION TO CARE
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  pt with tender left chest.  has recent purchase of a rowing machine, but only used once.  also with dm, htn, obesity.  concern for cardiac disease, can't do obs stress tomorrow, appropriate for inpt stress as high risk.    I performed a history and physical exam of the patient and discussed their management with the resident and /or advanced care provider. I reviewed the resident and /or ACP's note and agree with the documented findings and plan of care. My medical decison making and observations are found above.

## 2022-06-24 NOTE — H&P ADULT - NSICDXPASTMEDICALHX_GEN_ALL_CORE_FT
PAST MEDICAL HISTORY:  Diabetes mellitus     GERD (gastroesophageal reflux disease)     Hyperlipidemia     Hypertension

## 2022-06-24 NOTE — ED PROVIDER NOTE - CLINICAL SUMMARY MEDICAL DECISION MAKING FREE TEXT BOX
Pt is a 65 yo M w/pmHx of DM, HTN, GERD presenting with L chest pain x3 days. Reproducible chest pain worse on palpation localized to L chest under breast. DDx includes but is not limited to: MSK vs ACS vs GERD. EKG showed sinus rhythm with PACs. HEART score = 3. Worked up for ACS. Likely d/c with f/u cardiology. Pt is a 67 yo M w/pmHx of DM, HTN, GERD presenting with L chest pain x3 days. Reproducible chest pain worse on palpation localized to L chest under breast. DDx includes but is not limited to: MSK vs ACS vs GERD. EKG showed complete heart block. HEART score = 3. Worked up for ACS. Likely d/c with f/u cardiology. Pt is a 67 yo M w/pmHx of DM, HTN, GERD presenting with L chest pain x3 days. Reproducible chest pain worse on palpation localized to L chest under breast. DDx includes but is not limited to: MSK vs ACS vs GERD. EKG showed complete heart block. HEART score = 3. Worked up for ACS. 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  pt with tender left chest.  has recent purchase of a rowing machine, but only used once.  also with dm, htn, obesity.  concern for cardiac disease, can't do obs stress tomorrow, appropriate for inpt stress as high risk.

## 2022-06-24 NOTE — H&P ADULT - NSHPPHYSICALEXAM_GEN_ALL_CORE
PHYSICAL EXAM:  Vital Signs Last 24 Hrs  T(C): 36.8 (06-24-22 @ 20:46)  T(F): 98.2 (06-24-22 @ 20:46), Max: 98.2 (06-24-22 @ 20:46)  HR: 67 (06-24-22 @ 20:46) (43 - 68)  BP: 156/65 (06-24-22 @ 20:46)  BP(mean): --  RR: 17 (06-24-22 @ 20:46) (17 - 22)  SpO2: 100% (06-24-22 @ 20:46) (97% - 100%)  Wt(kg): --    Constitutional: NAD, awake and alert, well developed  EYES: EOMI, conjunctiva clear  ENT:  Normal Hearing, no tonsillar exudates   Neck: Soft and supple , no thyromegaly   Respiratory: Breath sounds are clear bilaterally, No wheezing, rales or rhonchi, no tachypnea, no accessory muscle use  Cardiovascular: S1 and S2, regular rate and rhythm, no Murmurs, gallops or rubs, no JVD, no leg edema. No TTP to chest wall, No rash or signs of abscess/infection  Gastrointestinal: Bowel Sounds present, soft, nontender, nondistended, no guarding, no rebound  Extremities: No cyanosis or clubbing; warm to touch  Vascular: 2+ peripheral pulses lower ex  Neurological: No focal deficits, CN II-XII intact bilaterally, sensation to light touch intact in all extremities.  Musculoskeletal: 5/5 strength b/l upper and lower extremities; no joint swelling.  Skin: No rashes, no ulcerations

## 2022-06-24 NOTE — H&P ADULT - ASSESSMENT
66M with PMHx DM on insulin, HTN, GERD presenting with left sided chest pain x3 days. Atypical, possible MSK related

## 2022-06-24 NOTE — ED ADULT NURSE NOTE - OBJECTIVE STATEMENT
Facilitator RN: 65 y/o M arrives to E.D. rm 15 c/o CP and neck pain x3 days. PMH: DM2, HTN. A&Ox4, ambulatory, neg SOB, denies dizziness, endorses left-sided CP radiating up the neck, neg N/V/D, denies recent fever/cough/body aches. Denies recent heavy lifting. NSR on tele. Call bell in reach. Report given to primary RN.

## 2022-06-24 NOTE — ED PROVIDER NOTE - OBJECTIVE STATEMENT
Pt is a 65 yo F w/pmHx of DM, HTN, GERD presenting with L sided chest pain x3 days. Pt states pain travels up L neck, is intermittent, and worse when lifting his arm over his head. Pt denies SOB, N/V/D, fever, chills, and adnominal pain. . Pt is on aspirin, atenolol, lisinopril, insulin. Took his meds last night.

## 2022-06-24 NOTE — H&P ADULT - PROBLEM SELECTOR PLAN 1
Left sided chest pain, atraumatic and without strenuous activity. Worse with arm movement. May be MSK related  EKG with sinus linda with sinus arrhythmia. no signs of heart block. TWI in V5 and V6  Troponins negative  No recent cardiac w/u  -tele  -echo  -rest of ischemic eval per primary cardiology team in AM  -add on TSH, probnp  -tylenol PRN, lido patch

## 2022-06-24 NOTE — ED PROVIDER NOTE - NS_EDPROVIDERDISPOUSERTYPE_ED_A_ED
Pt unable to provide due to altered mental status/encephalopathy Attending Attestation (For Attendings USE Only)...

## 2022-06-24 NOTE — H&P ADULT - NSHPREVIEWOFSYSTEMS_GEN_ALL_CORE
ROS:    Constitutional: [ ] fevers [ ] chills  HEENT:  [ ] postnasal drip [ ] nasal congestion  CV: [x ] chest pain [ ] orthopnea [ ] palpitations   Resp: [ ] cough [ ] shortness of breath [ ] dyspnea   GI: [ ] nausea [ ] vomiting [ ] diarrhea [ ] constipation [ ] abd pain   : [ ] dysuria  [ ] increased urinary frequency  Musculoskeletal: [ ] back pain [ ] myalgias [ ] arthralgias  Skin: [ ] rash [ ] itch  Neurological: [ ] headache [ ] dizziness [ ] syncope   Endocrine: [ ] diabetes [ ] thyroid problem  Hematologic/Lymphatic: [ ] anemia [ ] bleeding problem  [x ] All other systems negative

## 2022-06-25 LAB
ANION GAP SERPL CALC-SCNC: 9 MMOL/L — SIGNIFICANT CHANGE UP (ref 7–14)
BUN SERPL-MCNC: 17 MG/DL — SIGNIFICANT CHANGE UP (ref 7–23)
CALCIUM SERPL-MCNC: 9.1 MG/DL — SIGNIFICANT CHANGE UP (ref 8.4–10.5)
CHLORIDE SERPL-SCNC: 102 MMOL/L — SIGNIFICANT CHANGE UP (ref 98–107)
CO2 SERPL-SCNC: 27 MMOL/L — SIGNIFICANT CHANGE UP (ref 22–31)
CREAT SERPL-MCNC: 0.9 MG/DL — SIGNIFICANT CHANGE UP (ref 0.5–1.3)
EGFR: 95 ML/MIN/1.73M2 — SIGNIFICANT CHANGE UP
GLUCOSE SERPL-MCNC: 269 MG/DL — HIGH (ref 70–99)
HCT VFR BLD CALC: 41.2 % — SIGNIFICANT CHANGE UP (ref 39–50)
HCV AB S/CO SERPL IA: 0.19 S/CO — SIGNIFICANT CHANGE UP (ref 0–0.99)
HCV AB SERPL-IMP: SIGNIFICANT CHANGE UP
HGB BLD-MCNC: 13.2 G/DL — SIGNIFICANT CHANGE UP (ref 13–17)
MAGNESIUM SERPL-MCNC: 1.8 MG/DL — SIGNIFICANT CHANGE UP (ref 1.6–2.6)
MCHC RBC-ENTMCNC: 26.8 PG — LOW (ref 27–34)
MCHC RBC-ENTMCNC: 32 GM/DL — SIGNIFICANT CHANGE UP (ref 32–36)
MCV RBC AUTO: 83.6 FL — SIGNIFICANT CHANGE UP (ref 80–100)
NRBC # BLD: 0 /100 WBCS — SIGNIFICANT CHANGE UP
NRBC # FLD: 0 K/UL — SIGNIFICANT CHANGE UP
PHOSPHATE SERPL-MCNC: 2.8 MG/DL — SIGNIFICANT CHANGE UP (ref 2.5–4.5)
PLATELET # BLD AUTO: 146 K/UL — LOW (ref 150–400)
POTASSIUM SERPL-MCNC: 4.3 MMOL/L — SIGNIFICANT CHANGE UP (ref 3.5–5.3)
POTASSIUM SERPL-SCNC: 4.3 MMOL/L — SIGNIFICANT CHANGE UP (ref 3.5–5.3)
RBC # BLD: 4.93 M/UL — SIGNIFICANT CHANGE UP (ref 4.2–5.8)
RBC # FLD: 13.7 % — SIGNIFICANT CHANGE UP (ref 10.3–14.5)
SODIUM SERPL-SCNC: 138 MMOL/L — SIGNIFICANT CHANGE UP (ref 135–145)
WBC # BLD: 5.35 K/UL — SIGNIFICANT CHANGE UP (ref 3.8–10.5)
WBC # FLD AUTO: 5.35 K/UL — SIGNIFICANT CHANGE UP (ref 3.8–10.5)

## 2022-06-25 RX ORDER — INSULIN LISPRO 100/ML
5 VIAL (ML) SUBCUTANEOUS
Refills: 0 | Status: DISCONTINUED | OUTPATIENT
Start: 2022-06-25 | End: 2022-06-27

## 2022-06-25 RX ORDER — MAGNESIUM SULFATE 500 MG/ML
2 VIAL (ML) INJECTION ONCE
Refills: 0 | Status: COMPLETED | OUTPATIENT
Start: 2022-06-25 | End: 2022-06-25

## 2022-06-25 RX ADMIN — LIDOCAINE 1 PATCH: 4 CREAM TOPICAL at 19:20

## 2022-06-25 RX ADMIN — Medication 1: at 08:52

## 2022-06-25 RX ADMIN — Medication 4 UNIT(S): at 08:50

## 2022-06-25 RX ADMIN — Medication 2: at 12:28

## 2022-06-25 RX ADMIN — Medication 4 UNIT(S): at 12:28

## 2022-06-25 RX ADMIN — INSULIN GLARGINE 20 UNIT(S): 100 INJECTION, SOLUTION SUBCUTANEOUS at 22:07

## 2022-06-25 RX ADMIN — LIDOCAINE 1 PATCH: 4 CREAM TOPICAL at 22:12

## 2022-06-25 RX ADMIN — Medication 25 GRAM(S): at 09:51

## 2022-06-25 RX ADMIN — ENOXAPARIN SODIUM 40 MILLIGRAM(S): 100 INJECTION SUBCUTANEOUS at 00:03

## 2022-06-25 RX ADMIN — LIDOCAINE 1 PATCH: 4 CREAM TOPICAL at 09:50

## 2022-06-25 RX ADMIN — INSULIN GLARGINE 20 UNIT(S): 100 INJECTION, SOLUTION SUBCUTANEOUS at 00:14

## 2022-06-25 RX ADMIN — Medication 5 UNIT(S): at 17:32

## 2022-06-25 RX ADMIN — Medication 1: at 17:31

## 2022-06-25 RX ADMIN — LISINOPRIL 5 MILLIGRAM(S): 2.5 TABLET ORAL at 06:53

## 2022-06-25 RX ADMIN — Medication 81 MILLIGRAM(S): at 09:51

## 2022-06-25 NOTE — CONSULT NOTE ADULT - SUBJECTIVE AND OBJECTIVE BOX
CARDIOLOGY CONSULT - Dr. Zambrano  Date of Service: 6/25/22      HPI:  66M with PMHx DM on insulin, HTN, GERD presenting with left sided chest pain x3 days. Pain is 5/10, nonradiating and not associated with exertion. Pain is worsened with left arm raise. He does not recall any specific injury or trauma. He does not lift weights. He feels as though his left chest wall is tender. No rash or skin changes, cuts. He denies hx cardiac disease, MI, stents. He denies fevers, chills, cough, SOB, abdominal pain, N/V/D, LH, palpitations, LOC.    In ED EKG with sinus bradycardia @58bpm with sinus arrhythmia  Troponins 12--14 (24 Jun 2022 22:47)      PAST MEDICAL & SURGICAL HISTORY:  Hypertension      Diabetes mellitus      Hyperlipidemia      GERD (gastroesophageal reflux disease)      No significant past surgical history              PREVIOUS DIAGNOSTIC TESTING:    [ ] Echocardiogram:  [ ]  Catheterization:  [ ] Stress Test:  	    MEDICATIONS:  MEDICATIONS  (STANDING):  aspirin enteric coated 81 milliGRAM(s) Oral daily  ATENolol  Tablet 25 milliGRAM(s) Oral at bedtime  dextrose 5%. 1000 milliLiter(s) (50 mL/Hr) IV Continuous <Continuous>  dextrose 5%. 1000 milliLiter(s) (100 mL/Hr) IV Continuous <Continuous>  dextrose 50% Injectable 25 Gram(s) IV Push once  dextrose 50% Injectable 12.5 Gram(s) IV Push once  dextrose 50% Injectable 25 Gram(s) IV Push once  enoxaparin Injectable 40 milliGRAM(s) SubCutaneous every 24 hours  glucagon  Injectable 1 milliGRAM(s) IntraMuscular once  insulin glargine Injectable (LANTUS) 20 Unit(s) SubCutaneous at bedtime  insulin lispro (ADMELOG) corrective regimen sliding scale   SubCutaneous three times a day before meals  insulin lispro (ADMELOG) corrective regimen sliding scale   SubCutaneous at bedtime  insulin lispro Injectable (ADMELOG) 5 Unit(s) SubCutaneous three times a day before meals  lisinopril 5 milliGRAM(s) Oral daily      FAMILY HISTORY:  No pertinent family history in first degree relatives        SOCIAL HISTORY:    [ ] Non-smoker  [ ] Smoker  [ ] Alcohol    Allergies    Cipro (Rash)  statins (Muscle Pain)    Intolerances    	    REVIEW OF SYSTEMS:  CONSTITUTIONAL: No fever, weight loss, or fatigue  EYES: No eye pain, visual disturbances, or discharge  ENMT:  No difficulty hearing, tinnitus, vertigo; No sinus or throat pain  NECK: No pain or stiffness  RESPIRATORY: No cough, wheezing, chills or hemoptysis; No Shortness of Breath  CARDIOVASCULAR: No chest pain, palpitations, passing out, dizziness, or leg swelling  GASTROINTESTINAL: No abdominal or epigastric pain. No nausea, vomiting, or hematemesis; No diarrhea or constipation. No melena or hematochezia.  GENITOURINARY: No dysuria, frequency, hematuria, or incontinence  NEUROLOGICAL: No headaches, memory loss, loss of strength, numbness, or tremors  SKIN: No itching, burning, rashes, or lesions   	    [ ] All others negative	  [ ] Unable to obtain    PHYSICAL EXAM:  T(C): 36.8 (06-25-22 @ 06:54), Max: 37.3 (06-24-22 @ 23:46)  HR: 53 (06-25-22 @ 06:54) (43 - 67)  BP: 154/71 (06-25-22 @ 06:54) (143/61 - 162/72)  RR: 16 (06-25-22 @ 06:54) (16 - 22)  SpO2: 96% (06-25-22 @ 06:54) (96% - 100%)  Wt(kg): --  I&O's Summary      Appearance: Normal	  Psychiatry: A & O x 3, Mood & affect appropriate  HEENT:   Normal oral mucosa, PERRL, EOMI	  Lymphatic: No lymphadenopathy  Cardiovascular: Normal S1 S2,RRR, No JVD, No murmurs  Respiratory: Lungs clear to auscultation	  Gastrointestinal:  Soft, Non-tender, + BS	  Skin: No rashes, No ecchymoses, No cyanosis	  Neurologic: Non-focal  Extremities: Normal range of motion, No clubbing, cyanosis or edema  Vascular: Peripheral pulses palpable 2+ bilaterally    TELEMETRY: 	    ECG:  	  RADIOLOGY:  OTHER: 	  	  LABS:	 	    CARDIAC MARKERS:                                  13.2   5.35  )-----------( 146      ( 25 Jun 2022 05:35 )             41.2     06-25    138  |  102  |  17  ----------------------------<  269<H>  4.3   |  27  |  0.90    Ca    9.1      25 Jun 2022 05:35  Phos  2.8     06-25  Mg     1.80     06-25    TPro  7.4  /  Alb  4.5  /  TBili  0.3  /  DBili  x   /  AST  25  /  ALT  18  /  AlkPhos  50  06-24      proBNP: Serum Pro-Brain Natriuretic Peptide: 74 pg/mL (06-24 @ 16:55)    Lipid Profile:   HgA1c:   TSH: Thyroid Stimulating Hormone, Serum: 0.63 uIU/mL (06-24 @ 16:55)      ASSESSMENT/PLAN: 	              70 minutes spent on total encounter; more than 50% of the visit was spent counseling and/or coordinating care by the attending physician.

## 2022-06-25 NOTE — PROVIDER CONTACT NOTE (OTHER) - ASSESSMENT
Pt denies chest pain, headache, dizziness, light headedness. Pt states he is aware that his HR goes low when he sleeps bc of the medications he is currently taken. He denies any pain/discomfort.

## 2022-06-25 NOTE — CONSULT NOTE ADULT - ASSESSMENT
66M with PMHx DM on insulin, HTN, GERD presenting with left sided chest pain x3 days.    # Chest Pain  -atypical  -pt with multiple RF's  -last ischemic eval 7 years ago  -ECHO  -plan for exercise nuclear stress  -asa, statin, BB    #Hypertension  -cont outpt meds    #DM  -med eval    ACP - Advanced Care Planning    -Advanced care planning discussed with the patient. Advanced care planning forms discussed with the patient and/or family.  Risks, benefits, and alternatives of medical/cardiac procedures were discussed in detail with all questions answered. Up to 30 minutes were spent addressing advanced care planning.    70 minutes spent on total encounter; more than 50% of the visit was spent counseling and/or coordinating care by the attending physician.

## 2022-06-26 LAB
ANION GAP SERPL CALC-SCNC: 11 MMOL/L — SIGNIFICANT CHANGE UP (ref 7–14)
ANION GAP SERPL CALC-SCNC: 8 MMOL/L — SIGNIFICANT CHANGE UP (ref 7–14)
BUN SERPL-MCNC: 14 MG/DL — SIGNIFICANT CHANGE UP (ref 7–23)
BUN SERPL-MCNC: 15 MG/DL — SIGNIFICANT CHANGE UP (ref 7–23)
CALCIUM SERPL-MCNC: 8.8 MG/DL — SIGNIFICANT CHANGE UP (ref 8.4–10.5)
CALCIUM SERPL-MCNC: 8.9 MG/DL — SIGNIFICANT CHANGE UP (ref 8.4–10.5)
CHLORIDE SERPL-SCNC: 101 MMOL/L — SIGNIFICANT CHANGE UP (ref 98–107)
CHLORIDE SERPL-SCNC: 101 MMOL/L — SIGNIFICANT CHANGE UP (ref 98–107)
CO2 SERPL-SCNC: 23 MMOL/L — SIGNIFICANT CHANGE UP (ref 22–31)
CO2 SERPL-SCNC: 27 MMOL/L — SIGNIFICANT CHANGE UP (ref 22–31)
CREAT SERPL-MCNC: 0.85 MG/DL — SIGNIFICANT CHANGE UP (ref 0.5–1.3)
CREAT SERPL-MCNC: 0.88 MG/DL — SIGNIFICANT CHANGE UP (ref 0.5–1.3)
EGFR: 96 ML/MIN/1.73M2 — SIGNIFICANT CHANGE UP
EGFR: 97 ML/MIN/1.73M2 — SIGNIFICANT CHANGE UP
GLUCOSE SERPL-MCNC: 191 MG/DL — HIGH (ref 70–99)
GLUCOSE SERPL-MCNC: 197 MG/DL — HIGH (ref 70–99)
HCT VFR BLD CALC: 39.9 % — SIGNIFICANT CHANGE UP (ref 39–50)
HGB BLD-MCNC: 13 G/DL — SIGNIFICANT CHANGE UP (ref 13–17)
MAGNESIUM SERPL-MCNC: 1.9 MG/DL — SIGNIFICANT CHANGE UP (ref 1.6–2.6)
MAGNESIUM SERPL-MCNC: 2 MG/DL — SIGNIFICANT CHANGE UP (ref 1.6–2.6)
MCHC RBC-ENTMCNC: 26.8 PG — LOW (ref 27–34)
MCHC RBC-ENTMCNC: 32.6 GM/DL — SIGNIFICANT CHANGE UP (ref 32–36)
MCV RBC AUTO: 82.3 FL — SIGNIFICANT CHANGE UP (ref 80–100)
NRBC # BLD: 0 /100 WBCS — SIGNIFICANT CHANGE UP
NRBC # FLD: 0 K/UL — SIGNIFICANT CHANGE UP
PHOSPHATE SERPL-MCNC: 3.5 MG/DL — SIGNIFICANT CHANGE UP (ref 2.5–4.5)
PHOSPHATE SERPL-MCNC: 3.8 MG/DL — SIGNIFICANT CHANGE UP (ref 2.5–4.5)
PLATELET # BLD AUTO: 152 K/UL — SIGNIFICANT CHANGE UP (ref 150–400)
POTASSIUM SERPL-MCNC: 4.2 MMOL/L — SIGNIFICANT CHANGE UP (ref 3.5–5.3)
POTASSIUM SERPL-MCNC: 6 MMOL/L — HIGH (ref 3.5–5.3)
POTASSIUM SERPL-SCNC: 4.2 MMOL/L — SIGNIFICANT CHANGE UP (ref 3.5–5.3)
POTASSIUM SERPL-SCNC: 6 MMOL/L — HIGH (ref 3.5–5.3)
RBC # BLD: 4.85 M/UL — SIGNIFICANT CHANGE UP (ref 4.2–5.8)
RBC # FLD: 13.2 % — SIGNIFICANT CHANGE UP (ref 10.3–14.5)
SODIUM SERPL-SCNC: 135 MMOL/L — SIGNIFICANT CHANGE UP (ref 135–145)
SODIUM SERPL-SCNC: 136 MMOL/L — SIGNIFICANT CHANGE UP (ref 135–145)
WBC # BLD: 4.96 K/UL — SIGNIFICANT CHANGE UP (ref 3.8–10.5)
WBC # FLD AUTO: 4.96 K/UL — SIGNIFICANT CHANGE UP (ref 3.8–10.5)

## 2022-06-26 PROCEDURE — 93018 CV STRESS TEST I&R ONLY: CPT | Mod: GC

## 2022-06-26 PROCEDURE — 93016 CV STRESS TEST SUPVJ ONLY: CPT | Mod: GC

## 2022-06-26 PROCEDURE — 93306 TTE W/DOPPLER COMPLETE: CPT | Mod: 26

## 2022-06-26 PROCEDURE — 78452 HT MUSCLE IMAGE SPECT MULT: CPT | Mod: 26

## 2022-06-26 RX ADMIN — Medication 5 UNIT(S): at 17:26

## 2022-06-26 RX ADMIN — Medication 1: at 12:45

## 2022-06-26 RX ADMIN — INSULIN GLARGINE 20 UNIT(S): 100 INJECTION, SOLUTION SUBCUTANEOUS at 21:20

## 2022-06-26 RX ADMIN — ENOXAPARIN SODIUM 40 MILLIGRAM(S): 100 INJECTION SUBCUTANEOUS at 00:05

## 2022-06-26 RX ADMIN — Medication 5 UNIT(S): at 08:42

## 2022-06-26 RX ADMIN — Medication 1: at 08:41

## 2022-06-26 RX ADMIN — ENOXAPARIN SODIUM 40 MILLIGRAM(S): 100 INJECTION SUBCUTANEOUS at 23:11

## 2022-06-26 RX ADMIN — Medication 1: at 17:26

## 2022-06-26 RX ADMIN — Medication 81 MILLIGRAM(S): at 12:47

## 2022-06-26 RX ADMIN — Medication 5 UNIT(S): at 12:45

## 2022-06-26 RX ADMIN — LISINOPRIL 5 MILLIGRAM(S): 2.5 TABLET ORAL at 05:37

## 2022-06-26 NOTE — PROVIDER CONTACT NOTE (OTHER) - ACTION/TREATMENT ORDERED:
No further orders
ACP made aware. Notify if HR drops below 40bpm. No further interventions at this time.

## 2022-06-27 ENCOUNTER — TRANSCRIPTION ENCOUNTER (OUTPATIENT)
Age: 67
End: 2022-06-27

## 2022-06-27 VITALS
RESPIRATION RATE: 16 BRPM | DIASTOLIC BLOOD PRESSURE: 71 MMHG | TEMPERATURE: 99 F | HEART RATE: 61 BPM | OXYGEN SATURATION: 99 % | SYSTOLIC BLOOD PRESSURE: 152 MMHG

## 2022-06-27 LAB
ANION GAP SERPL CALC-SCNC: 13 MMOL/L — SIGNIFICANT CHANGE UP (ref 7–14)
BUN SERPL-MCNC: 16 MG/DL — SIGNIFICANT CHANGE UP (ref 7–23)
CALCIUM SERPL-MCNC: 9.3 MG/DL — SIGNIFICANT CHANGE UP (ref 8.4–10.5)
CHLORIDE SERPL-SCNC: 102 MMOL/L — SIGNIFICANT CHANGE UP (ref 98–107)
CHOLEST SERPL-MCNC: 252 MG/DL — HIGH
CO2 SERPL-SCNC: 25 MMOL/L — SIGNIFICANT CHANGE UP (ref 22–31)
CREAT SERPL-MCNC: 0.94 MG/DL — SIGNIFICANT CHANGE UP (ref 0.5–1.3)
EGFR: 91 ML/MIN/1.73M2 — SIGNIFICANT CHANGE UP
GLUCOSE SERPL-MCNC: 184 MG/DL — HIGH (ref 70–99)
HCT VFR BLD CALC: 41.9 % — SIGNIFICANT CHANGE UP (ref 39–50)
HDLC SERPL-MCNC: 44 MG/DL — SIGNIFICANT CHANGE UP
HGB BLD-MCNC: 13.9 G/DL — SIGNIFICANT CHANGE UP (ref 13–17)
LIPID PNL WITH DIRECT LDL SERPL: 191 MG/DL — HIGH
MAGNESIUM SERPL-MCNC: 1.8 MG/DL — SIGNIFICANT CHANGE UP (ref 1.6–2.6)
MCHC RBC-ENTMCNC: 27.1 PG — SIGNIFICANT CHANGE UP (ref 27–34)
MCHC RBC-ENTMCNC: 33.2 GM/DL — SIGNIFICANT CHANGE UP (ref 32–36)
MCV RBC AUTO: 81.7 FL — SIGNIFICANT CHANGE UP (ref 80–100)
NON HDL CHOLESTEROL: 208 MG/DL — HIGH
NRBC # BLD: 0 /100 WBCS — SIGNIFICANT CHANGE UP
NRBC # FLD: 0 K/UL — SIGNIFICANT CHANGE UP
PHOSPHATE SERPL-MCNC: 3.8 MG/DL — SIGNIFICANT CHANGE UP (ref 2.5–4.5)
PLATELET # BLD AUTO: 166 K/UL — SIGNIFICANT CHANGE UP (ref 150–400)
POTASSIUM SERPL-MCNC: 4 MMOL/L — SIGNIFICANT CHANGE UP (ref 3.5–5.3)
POTASSIUM SERPL-SCNC: 4 MMOL/L — SIGNIFICANT CHANGE UP (ref 3.5–5.3)
RBC # BLD: 5.13 M/UL — SIGNIFICANT CHANGE UP (ref 4.2–5.8)
RBC # FLD: 13.4 % — SIGNIFICANT CHANGE UP (ref 10.3–14.5)
SODIUM SERPL-SCNC: 140 MMOL/L — SIGNIFICANT CHANGE UP (ref 135–145)
TRIGL SERPL-MCNC: 86 MG/DL — SIGNIFICANT CHANGE UP
WBC # BLD: 5.43 K/UL — SIGNIFICANT CHANGE UP (ref 3.8–10.5)
WBC # FLD AUTO: 5.43 K/UL — SIGNIFICANT CHANGE UP (ref 3.8–10.5)

## 2022-06-27 RX ADMIN — Medication 81 MILLIGRAM(S): at 12:23

## 2022-06-27 RX ADMIN — Medication 1: at 08:37

## 2022-06-27 RX ADMIN — Medication 2: at 12:05

## 2022-06-27 RX ADMIN — Medication 5 UNIT(S): at 17:27

## 2022-06-27 RX ADMIN — LISINOPRIL 5 MILLIGRAM(S): 2.5 TABLET ORAL at 05:08

## 2022-06-27 RX ADMIN — Medication 5 UNIT(S): at 12:06

## 2022-06-27 RX ADMIN — Medication 1: at 17:26

## 2022-06-27 RX ADMIN — Medication 5 UNIT(S): at 08:38

## 2022-06-27 NOTE — PROGRESS NOTE ADULT - ASSESSMENT
66M with PMHx DM on insulin, HTN, GERD presenting with left sided chest pain x3 days.    # Chest Pain  -atypical  -pt with multiple RF's  -last ischemic eval 7 years ago  -ECHO  -plan for exercise nuclear stress  -asa, statin, BB    #Hypertension  -cont outpt meds    #DM  -med eval    ACP - Advanced Care Planning    -Advanced care planning discussed with the patient. Advanced care planning forms discussed with the patient and/or family.  Risks, benefits, and alternatives of medical/cardiac procedures were discussed in detail with all questions answered. Up to 30 minutes were spent addressing advanced care planning.    35 minutes spent on total encounter; more than 50% of the visit was spent counseling and/or coordinating care by the attending physician.  
66M with PMHx DM on insulin, HTN, GERD presenting with left sided chest pain x3 days.    # Chest Pain  -atypical  -resolved  -pt with multiple RF's  -last ischemic eval 7 years ago  -ECHO and stress normal   -normal lv, fxn, no ischemia   -asa, statin, BB    #Hypertension  -cont outpt meds    #DM  -med  f.u     stable for d/c home today     50 minutes spent on total encounter; more than 50% of the visit was spent counseling and/or coordinating care by the attending physician.  
66 M with PMHx DM on insulin, HTN, GERD presenting with left sided chest pain x3 days. Atypical, possible MSK related.
66M with PMHx DM on insulin, HTN, GERD presenting with left sided chest pain x3 days. Atypical, possible MSK related.
66M with PMHx DM on insulin, HTN, GERD presenting with left sided chest pain x3 days. Atypical, possible MSK related.

## 2022-06-27 NOTE — DISCHARGE NOTE PROVIDER - NSDCMRMEDTOKEN_GEN_ALL_CORE_FT
Admelog 100 units/mL injectable solution: 10 unit(s) injectable with breakfast and dinner  aspirin 81 mg oral delayed release tablet: 1 tab(s) orally once a day   atenolol 25 mg oral tablet: 1 tab(s) orally once a day (in the evening)  insulin isophane (NPH): 20 units BID  lisinopril 5 mg oral tablet: 1 tab(s) orally once a day (at bedtime)

## 2022-06-27 NOTE — PROGRESS NOTE ADULT - SUBJECTIVE AND OBJECTIVE BOX
CARDIOLOGY FOLLOW UP NOTE - DR. IRIZARRY    Patient Name: SHRAVAN PASTOR  Date of Service: 06-27-22 @ 16:59    Patient seen and examined  resolved sx    Subjective:    cv: denies chest pain, dyspnea, palpitations, dizziness  pulmonary: denies cough  GI: denies abdominal pain, nausea, vomiting  vascular/legs: no edema   skin: no rash  ROS: otherwise negative   overnight events:      PHYSICAL EXAM:  T(C): 36.8 (06-27-22 @ 12:17), Max: 36.8 (06-26-22 @ 21:15)  HR: 57 (06-27-22 @ 12:17) (54 - 63)  BP: 153/67 (06-27-22 @ 12:17) (138/78 - 153/67)  RR: 16 (06-27-22 @ 12:17) (16 - 18)  SpO2: 100% (06-27-22 @ 12:17) (98% - 100%)  Wt(kg): --  I&O's Summary    Daily     Daily     Appearance: Normal	  Cardiovascular: Normal S1 S2,RRR, No JVD, No murmurs  Respiratory: Lungs clear to auscultation	  Gastrointestinal:  Soft, Non-tender, + BS	  Extremities: Normal range of motion, No clubbing, cyanosis or edema      Home Medications:  Admelog 100 units/mL injectable solution: 10 unit(s) injectable with breakfast and dinner (24 Jun 2022 22:39)  atenolol 25 mg oral tablet: 1 tab(s) orally once a day (in the evening) (24 Jun 2022 22:39)  insulin isophane (NPH): 20 units BID (24 Jun 2022 22:39)  lisinopril 5 mg oral tablet: 1 tab(s) orally once a day (at bedtime) (24 Jun 2022 22:39)      MEDICATIONS  (STANDING):  aspirin enteric coated 81 milliGRAM(s) Oral daily  ATENolol  Tablet 25 milliGRAM(s) Oral at bedtime  dextrose 5%. 1000 milliLiter(s) (50 mL/Hr) IV Continuous <Continuous>  dextrose 5%. 1000 milliLiter(s) (100 mL/Hr) IV Continuous <Continuous>  dextrose 50% Injectable 25 Gram(s) IV Push once  dextrose 50% Injectable 12.5 Gram(s) IV Push once  dextrose 50% Injectable 25 Gram(s) IV Push once  enoxaparin Injectable 40 milliGRAM(s) SubCutaneous every 24 hours  glucagon  Injectable 1 milliGRAM(s) IntraMuscular once  insulin glargine Injectable (LANTUS) 20 Unit(s) SubCutaneous at bedtime  insulin lispro (ADMELOG) corrective regimen sliding scale   SubCutaneous three times a day before meals  insulin lispro (ADMELOG) corrective regimen sliding scale   SubCutaneous at bedtime  insulin lispro Injectable (ADMELOG) 5 Unit(s) SubCutaneous three times a day before meals  lisinopril 5 milliGRAM(s) Oral daily      TELEMETRY: 	    ECG:  	  RADIOLOGY:   DIAGNOSTIC TESTING:  [ ] Echocardiogram:  [ ] Catheterization:  [ ] Stress Test:    OTHER: 	    LABS:	 	    CARDIAC MARKERS:        Troponin T, High Sensitivity Result: 14 ng/L (06-24 @ 16:55)  Troponin T, High Sensitivity Result: 12 ng/L (06-24 @ 14:20)                                13.9   5.43  )-----------( 166      ( 27 Jun 2022 06:30 )             41.9     06-27    140  |  102  |  16  ----------------------------<  184<H>  4.0   |  25  |  0.94    Ca    9.3      27 Jun 2022 06:30  Phos  3.8     06-27  Mg     1.80     06-27      proBNP:     Lipid Profile:   HgA1c:     Creatinine, Serum: 0.94 mg/dL (06-27-22 @ 06:30)  Creatinine, Serum: 0.88 mg/dL (06-26-22 @ 10:35)  Creatinine, Serum: 0.85 mg/dL (06-26-22 @ 06:32)  Creatinine, Serum: 0.90 mg/dL (06-25-22 @ 05:35)            
CARDIOLOGY FOLLOW UP - Dr. Zambrano  Date of Service: 6/26/22  CC : no complaints    Review of Systems:  Constitutional: No fever, weight loss, or fatigue  Respiratory: No cough, wheezing, or hemoptysis, no shortness of breath  Cardiovascular: No chest pain, palpitations, passing out, dizziness, or leg swelling  Gastrointestinal: No abd or epigastric pain. No nausea, vomiting, or hematemesis; no diarrhea or consiptaiton, no melena or hematochezia  Vascular: No edema     TELEMETRY:    PHYSICAL EXAM:  T(C): 36.6 (06-26-22 @ 05:35), Max: 36.6 (06-26-22 @ 05:35)  HR: 51 (06-26-22 @ 05:35) (50 - 53)  BP: 143/79 (06-26-22 @ 05:35) (135/58 - 146/69)  RR: 18 (06-26-22 @ 05:35) (17 - 18)  SpO2: 97% (06-26-22 @ 05:35) (97% - 100%)  Wt(kg): --  I&O's Summary      Appearance: Normal	  Cardiovascular: Normal S1 S2,RRR, No JVD, No murmurs  Respiratory: Lungs clear to auscultation	  Gastrointestinal:  Soft, Non-tender, + BS	  Extremities: Normal range of motion, No clubbing, cyanosis or edema  Vascular: Peripheral pulses palpable 2+ bilaterally       Home Medications:  Admelog 100 units/mL injectable solution: 10 unit(s) injectable with breakfast and dinner (24 Jun 2022 22:39)  atenolol 25 mg oral tablet: 1 tab(s) orally once a day (in the evening) (24 Jun 2022 22:39)  insulin isophane (NPH): 20 units BID (24 Jun 2022 22:39)  lisinopril 5 mg oral tablet: 1 tab(s) orally once a day (at bedtime) (24 Jun 2022 22:39)        MEDICATIONS  (STANDING):  aspirin enteric coated 81 milliGRAM(s) Oral daily  ATENolol  Tablet 25 milliGRAM(s) Oral at bedtime  dextrose 5%. 1000 milliLiter(s) (50 mL/Hr) IV Continuous <Continuous>  dextrose 5%. 1000 milliLiter(s) (100 mL/Hr) IV Continuous <Continuous>  dextrose 50% Injectable 25 Gram(s) IV Push once  dextrose 50% Injectable 12.5 Gram(s) IV Push once  dextrose 50% Injectable 25 Gram(s) IV Push once  enoxaparin Injectable 40 milliGRAM(s) SubCutaneous every 24 hours  glucagon  Injectable 1 milliGRAM(s) IntraMuscular once  insulin glargine Injectable (LANTUS) 20 Unit(s) SubCutaneous at bedtime  insulin lispro (ADMELOG) corrective regimen sliding scale   SubCutaneous three times a day before meals  insulin lispro (ADMELOG) corrective regimen sliding scale   SubCutaneous at bedtime  insulin lispro Injectable (ADMELOG) 5 Unit(s) SubCutaneous three times a day before meals  lisinopril 5 milliGRAM(s) Oral daily        EKG:  RADIOLOGY:  DIAGNOSTIC TESTING:  [ ] Echocardiogram:  [ ] Catherterization:  [ ] Stress Test:  OTHER:     LABS:	 	                          13.0   4.96  )-----------( 152      ( 26 Jun 2022 06:32 )             39.9     06-26    135  |  101  |  15  ----------------------------<  197<H>  6.0<H>   |  23  |  0.85    Ca    8.8      26 Jun 2022 06:32  Phos  3.8     06-26  Mg     2.00     06-26    TPro  7.4  /  Alb  4.5  /  TBili  0.3  /  DBili  x   /  AST  25  /  ALT  18  /  AlkPhos  50  06-24          CARDIAC MARKERS:                  
SUBJECTIVE / OVERNIGHT EVENTS:  TTE normal LV  stress test done, pending results  tele no events  no cp, no sob, no n/v/d. no abdominal pain.  no headache, no dizziness.   sugars stable     --------------------------------------------------------------------------------------------  LABS:                        13.0   4.96  )-----------( 152      ( 26 Jun 2022 06:32 )             39.9     06-26    136  |  101  |  14  ----------------------------<  191<H>  4.2   |  27  |  0.88    Ca    8.9      26 Jun 2022 10:35  Phos  3.5     06-26  Mg     1.90     06-26        CAPILLARY BLOOD GLUCOSE      POCT Blood Glucose.: 221 mg/dL (26 Jun 2022 21:09)  POCT Blood Glucose.: 182 mg/dL (26 Jun 2022 17:16)  POCT Blood Glucose.: 161 mg/dL (26 Jun 2022 12:43)  POCT Blood Glucose.: 161 mg/dL (26 Jun 2022 08:37)            RADIOLOGY & ADDITIONAL TESTS:    Imaging Personally Reviewed:  [x] YES  [ ] NO    Consultant(s) Notes Reviewed:  [x] YES  [ ] NO    MEDICATIONS  (STANDING):  aspirin enteric coated 81 milliGRAM(s) Oral daily  ATENolol  Tablet 25 milliGRAM(s) Oral at bedtime  dextrose 5%. 1000 milliLiter(s) (50 mL/Hr) IV Continuous <Continuous>  dextrose 5%. 1000 milliLiter(s) (100 mL/Hr) IV Continuous <Continuous>  dextrose 50% Injectable 25 Gram(s) IV Push once  dextrose 50% Injectable 12.5 Gram(s) IV Push once  dextrose 50% Injectable 25 Gram(s) IV Push once  enoxaparin Injectable 40 milliGRAM(s) SubCutaneous every 24 hours  glucagon  Injectable 1 milliGRAM(s) IntraMuscular once  insulin glargine Injectable (LANTUS) 20 Unit(s) SubCutaneous at bedtime  insulin lispro (ADMELOG) corrective regimen sliding scale   SubCutaneous three times a day before meals  insulin lispro (ADMELOG) corrective regimen sliding scale   SubCutaneous at bedtime  insulin lispro Injectable (ADMELOG) 5 Unit(s) SubCutaneous three times a day before meals  lisinopril 5 milliGRAM(s) Oral daily    MEDICATIONS  (PRN):  acetaminophen     Tablet .. 650 milliGRAM(s) Oral every 6 hours PRN Temp greater or equal to 38C (100.4F), Mild Pain (1 - 3)  dextrose Oral Gel 15 Gram(s) Oral once PRN Blood Glucose LESS THAN 70 milliGRAM(s)/deciliter      Care Discussed with Consultants/Other Providers [x] YES  [ ] NO    Vital Signs Last 24 Hrs  T(C): 36.8 (26 Jun 2022 21:15), Max: 36.8 (26 Jun 2022 21:15)  T(F): 98.3 (26 Jun 2022 21:15), Max: 98.3 (26 Jun 2022 21:15)  HR: 54 (26 Jun 2022 21:15) (51 - 66)  BP: 148/66 (26 Jun 2022 21:15) (138/78 - 148/66)  BP(mean): --  RR: 18 (26 Jun 2022 21:15) (17 - 18)  SpO2: 99% (26 Jun 2022 21:15) (97% - 99%)  I&O's Summary      PHYSICAL EXAM:  GENERAL: NAD, well-developed, comfortable  HEAD:  Atraumatic, Normocephalic  EYES: EOMI, PERRLA, conjunctiva and sclera clear  NECK: Supple, No JVD  CHEST/LUNG: Clear to auscultation bilaterally; No wheeze  HEART: Regular rate and rhythm; No murmurs, rubs, or gallops, left sided chest wall pain, reproducible.   ABDOMEN: Soft, Nontender, Nondistended; Bowel sounds present  Neuro: AAOx3, no focal weakness, 5/5 b/l extremity strength  EXTREMITIES:  2+ Peripheral Pulses, No clubbing, cyanosis, or edema  SKIN: No rashes or lesions   
SUBJECTIVE / OVERNIGHT EVENTS:  Feeling better today.  No overnight event.  No complaints.  Chest pain free. no SOB, no N/V/D.  Denied HA/dizziness, abdominal pain.   Stress test normal.  going home today  tele no events       --------------------------------------------------------------------------------------------  LABS:                        13.9   5.43  )-----------( 166      ( 27 Jun 2022 06:30 )             41.9     06-27    140  |  102  |  16  ----------------------------<  184<H>  4.0   |  25  |  0.94    Ca    9.3      27 Jun 2022 06:30  Phos  3.8     06-27  Mg     1.80     06-27        CAPILLARY BLOOD GLUCOSE      POCT Blood Glucose.: 187 mg/dL (27 Jun 2022 16:47)  POCT Blood Glucose.: 231 mg/dL (27 Jun 2022 11:44)  POCT Blood Glucose.: 162 mg/dL (27 Jun 2022 08:25)            RADIOLOGY & ADDITIONAL TESTS:    Imaging Personally Reviewed:  [x] YES  [ ] NO    Consultant(s) Notes Reviewed:  [x] YES  [ ] NO    MEDICATIONS  (STANDING):  aspirin enteric coated 81 milliGRAM(s) Oral daily  ATENolol  Tablet 25 milliGRAM(s) Oral at bedtime  dextrose 5%. 1000 milliLiter(s) (50 mL/Hr) IV Continuous <Continuous>  dextrose 5%. 1000 milliLiter(s) (100 mL/Hr) IV Continuous <Continuous>  dextrose 50% Injectable 25 Gram(s) IV Push once  dextrose 50% Injectable 12.5 Gram(s) IV Push once  dextrose 50% Injectable 25 Gram(s) IV Push once  enoxaparin Injectable 40 milliGRAM(s) SubCutaneous every 24 hours  glucagon  Injectable 1 milliGRAM(s) IntraMuscular once  insulin glargine Injectable (LANTUS) 20 Unit(s) SubCutaneous at bedtime  insulin lispro (ADMELOG) corrective regimen sliding scale   SubCutaneous three times a day before meals  insulin lispro (ADMELOG) corrective regimen sliding scale   SubCutaneous at bedtime  insulin lispro Injectable (ADMELOG) 5 Unit(s) SubCutaneous three times a day before meals  lisinopril 5 milliGRAM(s) Oral daily    MEDICATIONS  (PRN):  acetaminophen     Tablet .. 650 milliGRAM(s) Oral every 6 hours PRN Temp greater or equal to 38C (100.4F), Mild Pain (1 - 3)  dextrose Oral Gel 15 Gram(s) Oral once PRN Blood Glucose LESS THAN 70 milliGRAM(s)/deciliter      Care Discussed with Consultants/Other Providers [x] YES  [ ] NO    Vital Signs Last 24 Hrs  T(C): 37 (27 Jun 2022 17:42), Max: 37 (27 Jun 2022 17:42)  T(F): 98.6 (27 Jun 2022 17:42), Max: 98.6 (27 Jun 2022 17:42)  HR: 61 (27 Jun 2022 17:42) (57 - 61)  BP: 152/71 (27 Jun 2022 17:42) (144/61 - 153/67)  BP(mean): --  RR: 16 (27 Jun 2022 17:42) (16 - 17)  SpO2: 99% (27 Jun 2022 17:42) (99% - 100%)  I&O's Summary          PHYSICAL EXAM:  GENERAL: NAD, well-developed, comfortable  HEAD:  Atraumatic, Normocephalic  EYES: EOMI, PERRLA, conjunctiva and sclera clear  NECK: Supple, No JVD  CHEST/LUNG: Clear to auscultation bilaterally; No wheeze  HEART: Regular rate and rhythm; No murmurs, rubs, or gallops, left sided chest wall pain, reproducible.   ABDOMEN: Soft, Nontender, Nondistended; Bowel sounds present  Neuro: AAOx3, no focal weakness, 5/5 b/l extremity strength  EXTREMITIES:  2+ Peripheral Pulses, No clubbing, cyanosis, or edema  SKIN: No rashes or lesions   
SUBJECTIVE / OVERNIGHT EVENTS:  Pt seen and examined at bedside.   No overnight event.  Feeling better.  no cp, no sob, no n/v/d.   currently chest pain is left sided and somewhat reproducible with palpation underneath left pectoralis muscle.       --------------------------------------------------------------------------------------------  LABS:                        13.2   5.35  )-----------( 146      ( 25 Jun 2022 05:35 )             41.2     06-25    138  |  102  |  17  ----------------------------<  269<H>  4.3   |  27  |  0.90    Ca    9.1      25 Jun 2022 05:35  Phos  2.8     06-25  Mg     1.80     06-25    TPro  7.4  /  Alb  4.5  /  TBili  0.3  /  DBili  x   /  AST  25  /  ALT  18  /  AlkPhos  50  06-24      CAPILLARY BLOOD GLUCOSE      POCT Blood Glucose.: 218 mg/dL (25 Jun 2022 11:54)  POCT Blood Glucose.: 183 mg/dL (25 Jun 2022 08:26)  POCT Blood Glucose.: 198 mg/dL (24 Jun 2022 23:57)  POCT Blood Glucose.: 183 mg/dL (24 Jun 2022 20:45)            RADIOLOGY & ADDITIONAL TESTS:    Imaging Personally Reviewed:  [x] YES  [ ] NO    Consultant(s) Notes Reviewed:  [x] YES  [ ] NO    MEDICATIONS  (STANDING):  aspirin enteric coated 81 milliGRAM(s) Oral daily  ATENolol  Tablet 25 milliGRAM(s) Oral at bedtime  dextrose 5%. 1000 milliLiter(s) (50 mL/Hr) IV Continuous <Continuous>  dextrose 5%. 1000 milliLiter(s) (100 mL/Hr) IV Continuous <Continuous>  dextrose 50% Injectable 25 Gram(s) IV Push once  dextrose 50% Injectable 12.5 Gram(s) IV Push once  dextrose 50% Injectable 25 Gram(s) IV Push once  enoxaparin Injectable 40 milliGRAM(s) SubCutaneous every 24 hours  glucagon  Injectable 1 milliGRAM(s) IntraMuscular once  insulin glargine Injectable (LANTUS) 20 Unit(s) SubCutaneous at bedtime  insulin lispro (ADMELOG) corrective regimen sliding scale   SubCutaneous three times a day before meals  insulin lispro (ADMELOG) corrective regimen sliding scale   SubCutaneous at bedtime  insulin lispro Injectable (ADMELOG) 5 Unit(s) SubCutaneous three times a day before meals  lisinopril 5 milliGRAM(s) Oral daily    MEDICATIONS  (PRN):  acetaminophen     Tablet .. 650 milliGRAM(s) Oral every 6 hours PRN Temp greater or equal to 38C (100.4F), Mild Pain (1 - 3)  dextrose Oral Gel 15 Gram(s) Oral once PRN Blood Glucose LESS THAN 70 milliGRAM(s)/deciliter      Care Discussed with Consultants/Other Providers [x] YES  [ ] NO    Vital Signs Last 24 Hrs  T(C): 36.8 (25 Jun 2022 06:54), Max: 37.3 (24 Jun 2022 23:46)  T(F): 98.3 (25 Jun 2022 06:54), Max: 99.1 (24 Jun 2022 23:46)  HR: 53 (25 Jun 2022 06:54) (43 - 67)  BP: 154/71 (25 Jun 2022 06:54) (143/61 - 162/72)  BP(mean): --  RR: 16 (25 Jun 2022 06:54) (16 - 22)  SpO2: 96% (25 Jun 2022 06:54) (96% - 100%)  I&O's Summary      PHYSICAL EXAM:  GENERAL: NAD, well-developed, comfortable  HEAD:  Atraumatic, Normocephalic  EYES: EOMI, PERRLA, conjunctiva and sclera clear  NECK: Supple, No JVD  CHEST/LUNG: Clear to auscultation bilaterally; No wheeze  HEART: Regular rate and rhythm; No murmurs, rubs, or gallops, left sided chest wall pain, reproducible.   ABDOMEN: Soft, Nontender, Nondistended; Bowel sounds present  Neuro: AAOx3, no focal weakness, 5/5 b/l extremity strength  EXTREMITIES:  2+ Peripheral Pulses, No clubbing, cyanosis, or edema  SKIN: No rashes or lesions

## 2022-06-27 NOTE — DISCHARGE NOTE PROVIDER - CARE PROVIDER_API CALL
Alejandro Zambrano)  Cardiology  [address], [address]  [place] [address]  Phone: [phone]  Fax: [phone]  Follow Up Time: 2 weeks

## 2022-06-27 NOTE — DISCHARGE NOTE PROVIDER - NSDCCPCAREPLAN_GEN_ALL_CORE_FT
PRINCIPAL DISCHARGE DIAGNOSIS  Diagnosis: Chest pain  Assessment and Plan of Treatment: You had chest pain, which has now resolved. Your blood work was normal. Your echocardiogram was normal. You had a normal stress test.   Monitor for signs/symptoms such as, increased heart rate, palpitations, chest pain, dizziness, or shortness of breath - Return to emergency room if these signs/symptoms are present. Please follow up closely with cardiology in 2 weeks for further management.      SECONDARY DISCHARGE DIAGNOSES  Diagnosis: HTN (hypertension)  Assessment and Plan of Treatment: Continue current blood pressure medication regimen as directed. Monitor for any visual changes, headaches or dizziness.  Monitor blood pressure regularly.  Follow up with your PCP for further management for high blood pressure.      Diagnosis: Type 2 diabetes mellitus with hyperglycemia, with long-term current use of insulin  Assessment and Plan of Treatment: Your A1c is 9.8. Please closely follow up with your PCP or endocrinologist for strict glycemic (sugar) control. Monitor finger sticks pre-meal and bedtime, low salt, fat and carbohydrate diet, minimize glucose intake.  Exercise daily for at least 30 minutes and weight loss.  Follow up with primary care physician and endocrinologist for routine Hemoglobin A1C checks and management.  Follow up with your ophthalmologist for routine yearly vision exams.

## 2022-06-27 NOTE — PROGRESS NOTE ADULT - PROBLEM SELECTOR PLAN 1
Left sided chest pain, atraumatic and without strenuous activity. Worse with arm movement. May be MSK related  EKG with sinus linda with sinus arrhythmia. no signs of heart block. TWI in V5 and V6  No recent cardiac w/u  -tele monitoring   -echo pending   -Cardiology eval.  -TSH, probnp, trops neg.   -tylenol PRN, lido patch
Left sided chest pain, atraumatic and without strenuous activity. Worse with arm movement. May be MSK related  EKG with sinus linda with sinus arrhythmia. no signs of heart block. TWI in V5 and V6  No recent cardiac w/u. does have risk factors  -tele monitoring: no events  -echo normal LV  -Cardiology eval appreciated.   -TSH, probnp, trops neg.   -tylenol PRN, lido patch  - stress test done, normal   - no objection to d/c planning per the primary team.
Left sided chest pain, atraumatic and without strenuous activity. Worse with arm movement. May be MSK related  EKG with sinus linda with sinus arrhythmia. no signs of heart block. TWI in V5 and V6  No recent cardiac w/u. does have risk factors  -tele monitoring: no events  -echo normal LV  -Cardiology eval appreciated.   -TSH, probnp, trops neg.   -tylenol PRN, lido patch  - stress test done, pending results

## 2022-06-27 NOTE — DISCHARGE NOTE PROVIDER - HOSPITAL COURSE
66M with PMHx DM on insulin, HTN, GERD presenting with left sided chest pain x3 days.    Chest pain.   Left sided chest pain, atraumatic and without strenuous activity. Worse with arm movement. May be MSK related  EKG with sinus linda with sinus arrhythmia. no signs of heart block. TWI in V5 and V6  No recent cardiac w/u. does have risk factors  tele monitoring: no events  TTE: normal LV  TSH, probnp, trops neg.   NST normal   c/w asa, statin and BB    HTN (hypertension).   Resume atenolol 25mg qd and lisinopril 5mg qd with hold parameters.    Type 2 diabetes mellitus with hyperglycemia, with long-term current use of insulin.   hgb A1C 9.8%, continue home regimen  endo follow up outpt.    Case discussed with Dr. Rae on 6/27/22 and patient cleared for discharge. Reviewed discharge medications with patient; All new medications requiring new prescription sent to pharmacy of patients choice. Reviewed need for prescription for previous home medications and new prescriptions sent if requested. Patient in agreement and understands.

## 2022-06-27 NOTE — DISCHARGE NOTE NURSING/CASE MANAGEMENT/SOCIAL WORK - PATIENT PORTAL LINK FT
You can access the FollowMyHealth Patient Portal offered by [facility] by registering at the following website: [email or web address]. By joining [facility]’s FollowMyHealth portal, you will also be able to view your health information using other applications (apps) compatible with our system.

## 2022-06-27 NOTE — PROGRESS NOTE ADULT - PROBLEM SELECTOR PLAN 3
-On NPH 15-20u BID - per pharmacy this converts to 24-32u of Lantus  -Will start conservatively with Lantus 20u qhs and uptitrate as needed  -on admelog 10u with breakfast and dinner depending on FSG. Will do admelog 5u TID  -ISS  -add on A1C  - will titrate up insulin as needed
-On NPH 15-20u BID - per pharmacy this converts to 24-32u of Lantus  -Will start conservatively with Lantus 20u qhs and uptitrate as needed  -on admelog 10u with breakfast and dinner depending on FSG.   - Will give premeal admelog 5u TID  -ISS, hgb A1C 9.8%  - will titrate up insulin as needed  - endo follow up outpt
-On NPH 15-20u BID - per pharmacy this converts to 24-32u of Lantus  -Will start conservatively with Lantus 20u qhs and uptitrate as needed  -on admelog 10u with breakfast and dinner depending on FSG.   - Will give premeal admelog 5u TID  -ISS, hgb A1C 9.8%  - will titrate up insulin as needed  - endo follow up outpt

## 2022-06-27 NOTE — PROGRESS NOTE ADULT - NSPROGADDITIONALINFOA_GEN_ALL_CORE
d/w pt and card.   d/w NP.     dc planning if neg stress test.    - Dr. JUAN JOSE Mathewet ([facility])  - [phone]
d/w pt and card.   d/w NP.     dc planning if neg stress test.    - Dr. JUAN JOSE Mathewet ([facility])  - [phone]
d/w pt and card.     - Dr. JUAN JOSE Rae ([facility])  - [phone]

## 2022-06-27 NOTE — PROGRESS NOTE ADULT - PROBLEM SELECTOR PLAN 2
Resume atenolol 25mg qd and lisinopril 5mg qd with hold parameters

## 2022-06-27 NOTE — PROGRESS NOTE ADULT - PROBLEM SELECTOR PLAN 4
Lovenox 40mg qd  DASH/TLC CC diet

## 2022-08-05 NOTE — H&P ADULT - NSHPLABSRESULTS_GEN_ALL_CORE
I have personally reviewed this patient's labs below:                        12.8   4.34  )-----------( 160      ( 24 Jun 2022 14:20 )             41.2     06-24-22 @ 14:20    143  |  103  |  21             --------------------------< 189<H>     5.3  |  29  | 1.07    eGFR AA: --  eGFR N-AA: --    Calcium: 9.2  Phosphorus: --  Magnesium: 1.90    AST: 25    ALT: 18  AlkPhos: 50  Protein: 7.4  Albumin: 4.5  TBili: 0.3  D-Bili: --    Troponin 12--14    I have personally reviewed this patient's EKG and my independent interpretation is sinus bradycardia @58bpm with sinus arrhythmia. No heart block. TWI V5 and V6    I have personally reviewed this patient's CXR and my independent interpretation is clear lungs No

## 2022-08-11 NOTE — ED CDU PROVIDER SUBSEQUENT DAY NOTE - NS ED MD CDU HISTORY DATE TIME DT
[address], [address]    [phone]  [facility] [id number]    [email or web address]    Patient Discharge Instructions    Oma Stoner / [id number] : 1930    Admitted 2022 Discharged 2022     It is important that you take the medication exactly as they are prescribed. Keep your medication in the bottles provided by the pharmacist and keep a list of the medication names, dosages, and times to be taken in your wallet. Do not take other medications without consulting your doctor. BRING ALL OF YOUR MEDICINES or a list of medicines with dosages TO YOUR OFFICE VISIT with Dr. Jenny Espinal. Cardiac Catheterization  Discharge Instructions  Transradial Catheterization Discharge Instructions (WRIST)     Discharge instructions: Your radial artery in your wrist was used for your cardiac catheterization. This site may be slightly bruised and sore following your procedure. Expect mild tingling or the hand and tenderness at the puncture site for up to 3 days. Excess movement of the wrist used should be avoided for the next 24-48 hours. No lifting over 2 pounds (approximately a ½ gallon of milk) with this arm for 24 hours. Keep the site of the procedure covered with a bandage for 24 hours. You may shower the day after your procedure. Do not take a tub bath or submerge the puncture site in water for 48 hours. No heavy impact activity/lifting > 10 pounds for 1 week. If bleeding of the wrist occurs at home:   If the site on your wrist where you had the catheterization procedure begins to bleed, do not panic. Place 1 or 2 fingers over the puncture site and hold pressure to stop the bleeding. You may be able to feel your pulse as you hold pressure. Lift your fingers after 5 minutes to see if the bleeding has stopped. Once the bleeding has stopped, gently wipe the wrist area clean and cover with a bandage.       If the bleeding from your wrist does not stop after 15 minutes, or if there is a large amount of bleeding or spurting, call 911 immediately (do not drive yourself to the hospital). Other concerns: The site may be slightly bruised and sore following your procedure. Should any of the following occur, contact your physician immediately:  Any cool or coldness of the arm, discoloration over a large area, ongoing numbness or any abnormal sensations , moderate to severe pain or swelling in the arm. Redness, soreness, swelling, chills or fever, or colored drainage at the procedure site within 3-7 days after your procedure. If you are smoking, please stop. Smoking Cessation Program is a free, phone/text/email based, smoking cessation program. The program is individualized to meet each patient's needs. To enroll use this link [email or web address]/[email or web address]        Information obtained by :  I understand that if any problems occur once I am at home I am to contact my physician. I understand and acknowledge receipt of the instructions indicated above. R.N.'s Signature                                                                  Date/Time                                                                                                                                              Patient or Representative Signature                                                          Date/Time      Syed Garcia MD      [address], [address]    [phone]  [facility] [address] [facility]    [email or web address] 06-Nov-2020 15:26

## 2024-10-02 NOTE — ED ADULT NURSE NOTE - ALCOHOL PRE SCREEN (AUDIT - C)
Med      !! QUEtiapine (SEROQUEL) 25 MG tablet Take 1 tablet by mouth 2 times daily 9/5/24:  25 mg twice daily for anxiety;  400 mg at bedtime.Historical Med      atorvastatin (LIPITOR) 40 MG tablet Take 1 tablet by mouth dailyHistorical Med      pantoprazole (PROTONIX) 40 MG tablet Take 1 tablet by mouth every morning (before breakfast), Disp-30 tablet,R-0Print       !! - Potential duplicate medications found. Please discuss with provider.          ALLERGIES     Dicyclomine, Ibuprofen, Sumatriptan, Tape [adhesive tape], Tizanidine, and Motrin [ibuprofen micronized]    FAMILY HISTORY       Family History   Problem Relation Age of Onset    Emphysema Mother     Heart Disease Mother     Arthritis Mother     Depression Mother     High Blood Pressure Mother     Heart Failure Father     Heart Disease Father     Arthritis Father     Diabetes Father     High Blood Pressure Father     Stroke Father     Substance Abuse Father     High Blood Pressure Brother     Heart Disease Sister     Kidney Disease Daughter     Breast Cancer Maternal Aunt           SOCIAL HISTORY       Social History     Socioeconomic History    Marital status:      Spouse name: None    Number of children: None    Years of education: None    Highest education level: None   Tobacco Use    Smoking status: Every Day     Current packs/day: 2.50     Average packs/day: 2.5 packs/day for 47.0 years (117.5 ttl pk-yrs)     Types: Cigarettes    Smokeless tobacco: Never   Vaping Use    Vaping status: Never Used   Substance and Sexual Activity    Alcohol use: No    Drug use: Not Currently    Sexual activity: Yes     Partners: Male     Social Determinants of Health     Financial Resource Strain: Low Risk  (11/5/2021)    Overall Financial Resource Strain (CARDIA)     Difficulty of Paying Living Expenses: Not hard at all   Food Insecurity: No Food Insecurity (9/3/2024)    Hunger Vital Sign     Worried About Running Out of Food in the Last Year: Never true     Ran 
Statement Selected

## 2025-04-08 NOTE — ED PROVIDER NOTE - MDM ORDERS SUBMITTED SELECTION
Clinic Care Coordination Contact  [facility]/Voicemail    Clinical Data: Care Coordinator Outreach    Outreach Documentation Number of Outreach Attempt   4/8/2025  11:10 AM 1       Left message on patient's voicemail with call back information and requested return call.      Plan: Care Coordinator will try to reach patient again in 10 business days.    David Myhre, RN   [facility] RN  [phone]      
Medications/Labs/EKG/Imaging Studies

## 2025-05-19 NOTE — ED CDU PROVIDER SUBSEQUENT DAY NOTE - EYES, MLM
Abe received labs and clinicals and currently reviewing case . ED MD and RN have been updated    Clear bilaterally, pupils equal, round and reactive to light.

## 2025-07-19 ENCOUNTER — EMERGENCY (EMERGENCY)
Facility: HOSPITAL | Age: 70
LOS: 1 days | End: 2025-07-19
Attending: EMERGENCY MEDICINE | Admitting: EMERGENCY MEDICINE
Payer: MEDICARE

## 2025-07-19 VITALS
OXYGEN SATURATION: 98 % | DIASTOLIC BLOOD PRESSURE: 74 MMHG | RESPIRATION RATE: 18 BRPM | WEIGHT: 199.96 LBS | HEIGHT: 68 IN | TEMPERATURE: 98 F | SYSTOLIC BLOOD PRESSURE: 174 MMHG | HEART RATE: 80 BPM

## 2025-07-19 VITALS
OXYGEN SATURATION: 97 % | HEART RATE: 73 BPM | DIASTOLIC BLOOD PRESSURE: 61 MMHG | SYSTOLIC BLOOD PRESSURE: 135 MMHG | RESPIRATION RATE: 18 BRPM | TEMPERATURE: 98 F

## 2025-07-19 PROBLEM — K21.9 GASTRO-ESOPHAGEAL REFLUX DISEASE WITHOUT ESOPHAGITIS: Chronic | Status: ACTIVE | Noted: 2022-06-24

## 2025-07-19 LAB
A1C WITH ESTIMATED AVERAGE GLUCOSE RESULT: 8.4 % — HIGH (ref 4–5.6)
ALBUMIN SERPL ELPH-MCNC: 4.7 G/DL — SIGNIFICANT CHANGE UP (ref 3.3–5)
ALP SERPL-CCNC: 51 U/L — SIGNIFICANT CHANGE UP (ref 40–120)
ALT FLD-CCNC: 11 U/L — SIGNIFICANT CHANGE UP (ref 4–41)
ANION GAP SERPL CALC-SCNC: 14 MMOL/L — SIGNIFICANT CHANGE UP (ref 7–14)
AST SERPL-CCNC: 14 U/L — SIGNIFICANT CHANGE UP (ref 4–40)
BASOPHILS # BLD AUTO: 0.02 K/UL — SIGNIFICANT CHANGE UP (ref 0–0.2)
BASOPHILS NFR BLD AUTO: 0.4 % — SIGNIFICANT CHANGE UP (ref 0–2)
BILIRUB SERPL-MCNC: 0.3 MG/DL — SIGNIFICANT CHANGE UP (ref 0.2–1.2)
BLOOD GAS VENOUS COMPREHENSIVE RESULT: SIGNIFICANT CHANGE UP
BUN SERPL-MCNC: 33 MG/DL — HIGH (ref 7–23)
CALCIUM SERPL-MCNC: 9.7 MG/DL — SIGNIFICANT CHANGE UP (ref 8.4–10.5)
CHLORIDE SERPL-SCNC: 95 MMOL/L — LOW (ref 98–107)
CO2 SERPL-SCNC: 25 MMOL/L — SIGNIFICANT CHANGE UP (ref 22–31)
CREAT SERPL-MCNC: 1.58 MG/DL — HIGH (ref 0.5–1.3)
EGFR: 48 ML/MIN/1.73M2 — LOW
EGFR: 48 ML/MIN/1.73M2 — LOW
EOSINOPHIL # BLD AUTO: 0.02 K/UL — SIGNIFICANT CHANGE UP (ref 0–0.5)
EOSINOPHIL NFR BLD AUTO: 0.4 % — SIGNIFICANT CHANGE UP (ref 0–6)
ESTIMATED AVERAGE GLUCOSE: 194 — SIGNIFICANT CHANGE UP
GLUCOSE SERPL-MCNC: 256 MG/DL — HIGH (ref 70–99)
HCT VFR BLD CALC: 37.3 % — LOW (ref 39–50)
HGB BLD-MCNC: 12.8 G/DL — LOW (ref 13–17)
IMM GRANULOCYTES # BLD AUTO: 0.02 K/UL — SIGNIFICANT CHANGE UP (ref 0–0.07)
IMM GRANULOCYTES NFR BLD AUTO: 0.4 % — SIGNIFICANT CHANGE UP (ref 0–0.9)
LYMPHOCYTES # BLD AUTO: 1.08 K/UL — SIGNIFICANT CHANGE UP (ref 1–3.3)
LYMPHOCYTES NFR BLD AUTO: 20.1 % — SIGNIFICANT CHANGE UP (ref 13–44)
MCHC RBC-ENTMCNC: 26.1 PG — LOW (ref 27–34)
MCHC RBC-ENTMCNC: 34.3 G/DL — SIGNIFICANT CHANGE UP (ref 32–36)
MCV RBC AUTO: 76.1 FL — LOW (ref 80–100)
MONOCYTES # BLD AUTO: 0.34 K/UL — SIGNIFICANT CHANGE UP (ref 0–0.9)
MONOCYTES NFR BLD AUTO: 6.3 % — SIGNIFICANT CHANGE UP (ref 2–14)
NEUTROPHILS # BLD AUTO: 3.89 K/UL — SIGNIFICANT CHANGE UP (ref 1.8–7.4)
NEUTROPHILS NFR BLD AUTO: 72.4 % — SIGNIFICANT CHANGE UP (ref 43–77)
NRBC # BLD AUTO: 0 K/UL — SIGNIFICANT CHANGE UP (ref 0–0)
NRBC # FLD: 0 K/UL — SIGNIFICANT CHANGE UP (ref 0–0)
NRBC BLD AUTO-RTO: 0 /100 WBCS — SIGNIFICANT CHANGE UP (ref 0–0)
PLATELET # BLD AUTO: 197 K/UL — SIGNIFICANT CHANGE UP (ref 150–400)
PMV BLD: 11.1 FL — SIGNIFICANT CHANGE UP (ref 7–13)
POTASSIUM SERPL-MCNC: 4.5 MMOL/L — SIGNIFICANT CHANGE UP (ref 3.5–5.3)
POTASSIUM SERPL-SCNC: 4.5 MMOL/L — SIGNIFICANT CHANGE UP (ref 3.5–5.3)
PROT SERPL-MCNC: 7.8 G/DL — SIGNIFICANT CHANGE UP (ref 6–8.3)
RBC # BLD: 4.9 M/UL — SIGNIFICANT CHANGE UP (ref 4.2–5.8)
RBC # FLD: 14.2 % — SIGNIFICANT CHANGE UP (ref 10.3–14.5)
SODIUM SERPL-SCNC: 134 MMOL/L — LOW (ref 135–145)
WBC # BLD: 5.37 K/UL — SIGNIFICANT CHANGE UP (ref 3.8–10.5)
WBC # FLD AUTO: 5.37 K/UL — SIGNIFICANT CHANGE UP (ref 3.8–10.5)

## 2025-07-19 PROCEDURE — 99284 EMERGENCY DEPT VISIT MOD MDM: CPT

## 2025-07-19 PROCEDURE — 93010 ELECTROCARDIOGRAM REPORT: CPT

## 2025-07-19 RX ORDER — INSULIN LISPRO 100 U/ML
5 INJECTION, SOLUTION INTRAVENOUS; SUBCUTANEOUS ONCE
Refills: 0 | Status: COMPLETED | OUTPATIENT
Start: 2025-07-19 | End: 2025-07-19

## 2025-07-19 RX ADMIN — Medication 20 MILLIGRAM(S): at 12:29

## 2025-07-19 RX ADMIN — INSULIN LISPRO 5 UNIT(S): 100 INJECTION, SOLUTION INTRAVENOUS; SUBCUTANEOUS at 12:02

## 2025-07-19 NOTE — ED PROVIDER NOTE - NSICDXPASTMEDICALHX_GEN_ALL_CORE_FT
PAST MEDICAL HISTORY:  Diabetes mellitus     GERD (gastroesophageal reflux disease)     Hyperlipidemia     Hypertension     
(0) independent

## 2025-07-19 NOTE — ED ADULT NURSE NOTE - NSFALLUNIVINTERV_ED_ALL_ED
Bed/Stretcher in lowest position, wheels locked, appropriate side rails in place/Call bell, personal items and telephone in reach/Instruct patient to call for assistance before getting out of bed/chair/stretcher/Non-slip footwear applied when patient is off stretcher/[place] to call system/Physically safe environment - no spills, clutter or unnecessary equipment/Purposeful proactive rounding/Room/bathroom lighting operational, light cord in reach

## 2025-07-19 NOTE — ED PROVIDER NOTE - CLINICAL SUMMARY MEDICAL DECISION MAKING FREE TEXT BOX
RANJITH: 69y male with past medical history of T2DM, HTN presenting to ED with complaint of hypoglycemia due to freestyle shashi alerts after new monitor was applied 11pm yesterday, however on ED arrival  POCT fingerstick 361. Suspect malfunctioning glucometer. No e/o DKA here. Pt had been taking sugar at home to bring up his glucose after monitor alerted and did not take nay insulin, likely cause of hyperglycemia. Will give home dose here, advised pt to remove shashi monitor, pt has fingerstick check monitor at home advised to use this instead and already has f/u with endo in 3 days. RANJITH: 69y male with past medical history of T2DM, HTN presenting to ED with complaint of hypoglycemia due to freestyle shashi alerts after new monitor was applied 11pm yesterday, however on ED arrival  POCT fingerstick 361. Suspect malfunctioning glucometer. No e/o DKA here. Pt had been taking sugar at home to bring up his glucose after monitor alerted and did not take nay insulin, likely cause of hyperglycemia. Will give home dose here, advised pt to remove shashi monitor, pt has fingerstick check monitor at home advised to use this instead and already has f/u with endo in 3 days.    69y male with past medical history of T2DM, HTN, DM, currently on Mounjaro, presenting to ED with complaint of hypoglycemia due to freestyle shashi alerts after new monitor was applied 11pm yesterday, and after patient arrived to the ED when POCT fingerstick this morning pt was hyperglycemic with blood glucose of 361. Pt was awoken at home from sleep by the low glucose alerts from his monitor from sleep and has been consuming apple juice, lozenges, crackers ever since, as the low glucose alerts have not stopped showing between 50-60 glucose. Pt is typically compliant with medications but due to low glucose monitor reading he has not taken any of his medication, including insulin which is normally taken if glucose is 150+, since yesterday. pt reports his glucose is typically  in the morning before eating and 130's throughout the day. Pt reported blurry vision for the past 2 months but says that it has not gotten worse and is being followed by his PCP and endocrinologist.  Pt denies any chest pain, abdominal pain, polyuria, dysuria, changes in bowel habits, dizziness, fatigue, weakness, dyspnea, SOB, paresthesia, headache, diaphoresis.    On exam appears well.  In no acute distress.  Accompanied by daughter to emergency department.  Vitals are stable.  Blood pressure 151/69, heart rate 71, temp 30 6.7F, O2 sat 100% on room air.  Patient device continues to alert patient critical hypoglycemia despite fingerstick elevated on arrival.  Will order basic labs and assess serum blood glucose.  Patient does not take insulin daily.  Given elevation in blood glucose at this time will recommend patient remove device and follow-up with endocrinologist for refill.  patient has his fingerstick blood glucose test at home and can continue to assess his glucose at home. No evidence of DKA or HHS on exam/labs.

## 2025-07-19 NOTE — ED ADULT NURSE REASSESSMENT NOTE - NS ED NURSE REASSESS COMMENT FT1
Pt is A&Ox4, resting in stretcher with no complaints at this time. Respirations even and unlabored, chest rise equal b/l. VS as noted in flow sheets. Pt denies chest pain, SOB, abd pain, nausea, h/a, or dizziness at this time. No acute distress noted. Pt discharged by FAYE Ferguson. IV removed. Safety maintained throughout.

## 2025-07-19 NOTE — ED PROVIDER NOTE - PROGRESS NOTE DETAILS
FAYE Ferguson: Patient labs resulted.  Blood glucose decreased from fingerstick.  Fingerstick on arrival 361.  Blood glucose 291.  Patient's monitor continues to report critical lab blood glucose in the 50s.  Discussed with patient that serum blood glucose is elevated secondary to him eating candies and drinking apple juice after hypoglycemic alert from his device.  Patient has refill of monitors at pharmacy.  He is accompanied by his daughter today.  He does have fingerstick device at home where he can continue this monitor his glucose independently.  He has follow-up with endocrinology next week.  At this time we will provide 5 units of fast acting insulin to bring down blood glucose.  Will likely dispo to home with patient recommended to continue to check blood glucose with fingersticks until seen by endocrinology.  He is currently experiencing heartburn secondary to Mounjaro increase medication.  Will provide medication for acid reflux. FAYE Ferguson: Patient received insulin and pepcid. Appears well and vitals to be rechecked. Discussed plan for discharge with patient and daughter. Patient to remove glucose monitor device And will continue to check glucose by fingerstick.  He will follow-up with endocrinologist early next week.  Patient has adequate amount of insulin at home. Can provide acid reflux medication, recommend patient discuss acid reflux with predscriber of mounjaro. FAYE Ferguson: Patient received insulin and pepcid. Appears well and vitals to be rechecked. Discussed plan for discharge with patient and daughter. Patient to remove glucose monitor device And will continue to check glucose by fingerstick.  He will follow-up with endocrinologist early next week.  Patient has adequate amount of insulin at home. Can provide acid reflux medication, recommend patient discuss acid reflux with predscriber of mounjaro. Patient's lactate only mildly elevated at 2.1 discussed lab cutoff is 2.0.  Discussed with attending.  Not required to repeat lactate at this time as patient is tolerating p.o.

## 2025-07-19 NOTE — ED PROVIDER NOTE - PATIENT PORTAL LINK FT
You can access the FollowMyHealth Patient Portal offered by [facility] by registering at the following website: [email or web address]. By joining [employer]’s FollowMyHealth portal, you will also be able to view your health information using other applications (apps) compatible with our system.

## 2025-07-19 NOTE — ED ADULT NURSE NOTE - OBJECTIVE STATEMENT
----- Message from Narcisa Galeas MD sent at 3/8/2021  9:16 AM CST -----  Labs reviewed. Will discuss at office visit. Received pt to room 27. Pt is a 70 y/o Male, A&Ox4, ambulatory with a PHx of DM.   Pt presents to ED with c/o intermittent blurry vision x months and low blood sugar readings at home. Pt states his glucometer might be malfunctioning because his FS in triage is 351. Respirations even and unlabored, chest rise equal b/l. VS as noted in flow sheets. Pt denies chest pain, SOB, fever, cough, chills, abdominal pain, N/V/D, h/a, dizziness, numbness/tingling or any urinary symptoms at this time. No acute distress noted. Safety maintained throughout. Received pt to room 27. Pt is a 70 y/o Male, A&Ox4, ambulatory with a PHx of DM and HTN. Pt presents to ED with c/o intermittent blurry vision x months and low blood sugar readings at home. Pt states his glucometer might be malfunctioning because his FS have been fluctuating from 50s-100s. FS in triage was 351. Pt reports blurry vision ongoing since starting eye drops for "muscular edema". Neuro/sensory intact. Respirations even and unlabored, chest rise equal b/l. VS as noted in flow sheets. Pt denies chest pain, SOB, fever, cough, chills, abdominal pain, N/V/D, h/a, dizziness, numbness/tingling or any urinary symptoms at this time. 20g IV placed in RAC. Labs collected and sent. No acute distress noted. Safety maintained throughout.

## 2025-07-19 NOTE — ED PROVIDER NOTE - NS ED ATTENDING STATEMENT MOD
This was a shared visit with the MARLEE. I reviewed and verified the documentation. I have seen and examined this patient and fully participated in the care of this patient as the teaching attending.  The service was shared with the MARLEE.  I reviewed and verified the documentation.

## 2025-07-19 NOTE — ED PROVIDER NOTE - NSFOLLOWUPINSTRUCTIONS_ED_ALL_ED_FT
You were seen in the emergency department for evaluation of change in glucose level in the setting of diabetes.  It was found that your recently exchanged implantable device is faulty, alerting low glucose although your blood sugar is not low.  It is recommended that you remove this implanted device and continue to monitor your blood glucose with fingersticks at home.  Testing completed in the emergency department is included in this discharge packet.  It was found that your blood glucose was elevated on arrival even though your implantable device continue to report low blood glucose.  You were provided with medication to aid in acid reflux secondary to taking Mounjaro.  It is recommended you follow-up with the provider who prescribes Mounjaro for continued acid reflux management or dose management.    You were seen in the emergency department for evaluation of change in glucose level in the setting of diabetes.  It was found that your implantable device is faulty.  It is recommended that you remove this implanted device and continue to monitor your blood glucose with fingersticks at home.  Testing completed in the emergency department is included in this discharge packet.  It was found that your blood glucose was elevated on arrival by fingerstick and through blood glucose.  Your implantable device continue to report low blood glucose.  You were provided with medication to aid in acid reflux secondary to taking Mounjaro.  It is recommended you follow-up with the provider who prescribes Mounjaro for continued acid reflux management or dose management.    Below is information on diabetes management.  Continue to follow-up with your endocrinologist.  Return to the emergency department if you experience continued changes in blood glucose or blood glucose levels that are not responsive to insulin management.    Living With Diabetes  Diabetes (type 1 diabetes mellitus or type 2 diabetes mellitus) is a condition in which the body does not make enough of a hormone called insulin or does not respond the right way to insulin. The job of insulin is to move sugars (glucose) into cells in the body. In people with diabetes, extra glucose builds up in the blood instead of going into cells. This results in high blood glucose (hyperglycemia).    How to manage lifestyle changes  To help manage your diabetes, you may need treatment, such as medicines. You may also need to make lifestyle changes. To take care of yourself, you should:  Monitor your glucose often.  Eat a healthy diet.  Exercise often.  Meet with your health care providers.  Take medicines as told by your health care providers.  Most people feel some stress when it comes to managing their diabetes. When this stress becomes too much, it is known as diabetes distress. This is very common. Living with diabetes can also place you at risk for depression and anxiety. These disorders can make your condition harder to manage.    How to recognize stress  You may have diabetes distress if:  You avoid or ignore your daily diabetes care. Daily care may include testing your glucose, following a meal plan, and taking medicines.  You feel overwhelmed by what you have to do each day for your care.  You feel anger, sadness, or fear when it comes to your daily care.  You feel fear or shame about not being perfect at doing the things you have been told to do.  Emotional distress    If you have diabetes distress, you may feel:  Anger about having diabetes.  Fear or frustration about your condition and the changes you need to make to manage it.  A lot of worry about the care that you need or the cost of the care that you need.  Like you caused your condition by doing something wrong.  Fear about changes in your blood glucose that you may not be able to predict.  Judged by your health care providers.  Very alone.  Depression    Having diabetes means that you are more at risk for depression. Your health care provider may test (screen) you for symptoms. Symptoms may include:  Loss of interest in things that you used to enjoy.  Feeling depressed much or most of the time.  A change in appetite.  Trouble getting to sleep or staying asleep.  Feeling tired most of the day.  Feeling nervous and anxious.  Feeling guilty and worried that you are a burden to others.  Having thoughts of hurting yourself or feeling that you want to die.  If you have any of these symptoms on more days than not and for 2 weeks or longer, you may have depression. This would be a good time to contact your health care provider.    How to manage diabetes distress  Person sitting at a desk and writing in a notebook.  To manage distress:  Learn as much as you can about your condition and its treatment. Take one step at a time to improve the way you manage your daily care.  Meet with an expert trained in diabetes care (certified diabetes educator). Take a class to learn how to manage your condition.  Consider working with a counselor or therapist.  Keep a journal of your thoughts and concerns.  Accept that some things are out of your control.  Talk with other people who have diabetes. It can help to talk about the distress that you feel.  Find ways to manage stress that work for you. These may include listening to music, art, exercise, meditation, and other hobbies.  Seek support from spiritual leaders, family, and friends.  Follow these instructions at home:  Do your best to follow your plan for how to manage your diabetes.  If you are struggling to follow your plan, talk with a diabetes educator or someone else who has diabetes. They may have ideas that will help.  Forgive yourself for not being perfect. Almost everyone struggles with the tasks of diabetes.  Keep all follow-up visits. Your health care provider will want to monitor your glucose levels. You can also discuss any concerns you have at these visits.  Where to find support  Find support from the American Diabetes Association (ADA): diabetes.org  Find an expert to help you manage your condition. Make an appointment through the Association of Diabetes [employer] & Education Specialists (ADCES): diabeteseducator.org  Contact a health care provider if:  You believe your diabetes is getting out of control.  You may be depressed.  Your medicines are not helping control your diabetes.  You feel overwhelmed.  Get help right away if:  You have thoughts about hurting yourself or others.  Get help right away if you feel like you may hurt yourself or others, or have thoughts about taking your own life. Go to your nearest emergency room or:  Call 911.  Call the National Suicide Prevention Lifeline at 1-591.737.7041 or 203. This is open 24 hours a day.  Text the Crisis Text Line at 014996.  This information is not intended to replace advice given to you by your health care provider. Make sure you discuss any questions you have with your health care provider. You were seen in the emergency department for evaluation of change in glucose level in the setting of diabetes.  It was found that your recently exchanged implantable device is faulty, alerting low glucose although your blood sugar is not low.  It is recommended that you remove this implanted device and continue to monitor your blood glucose with fingersticks at home.  Testing completed in the emergency department is included in this discharge packet.  It was found that your blood glucose was elevated on arrival even though your implantable device continue to report low blood glucose.  You were provided with medication to aid in acid reflux in the emergency department secondary to taking Mounjaro.  It is recommended you follow-up with the provider who prescribes Mounjaro for continued acid reflux management or dose management. Famotidine was sent to your pharmacy.  Take this medication morning and night to help acid reflux; unless directed otherwise by your endocrinologist    You were seen in the emergency department for evaluation of change in glucose level in the setting of diabetes.  It was found that your implantable device is faulty.  It is recommended that you remove this implanted device and continue to monitor your blood glucose with fingersticks at home.  Testing completed in the emergency department is included in this discharge packet.  It was found that your blood glucose was elevated on arrival by fingerstick and through blood glucose.  Your implantable device continue to report low blood glucose.  You were provided with medication to aid in acid reflux secondary to taking Mounjaro.  It is recommended you follow-up with the provider who prescribes Mounjaro for continued acid reflux management or dose management.    Below is information on diabetes management.  Continue to follow-up with your endocrinologist.  Return to the emergency department if you experience continued changes in blood glucose or blood glucose levels that are not responsive to insulin management.    Living With Diabetes  Diabetes (type 1 diabetes mellitus or type 2 diabetes mellitus) is a condition in which the body does not make enough of a hormone called insulin or does not respond the right way to insulin. The job of insulin is to move sugars (glucose) into cells in the body. In people with diabetes, extra glucose builds up in the blood instead of going into cells. This results in high blood glucose (hyperglycemia).    How to manage lifestyle changes  To help manage your diabetes, you may need treatment, such as medicines. You may also need to make lifestyle changes. To take care of yourself, you should:  Monitor your glucose often.  Eat a healthy diet.  Exercise often.  Meet with your health care providers.  Take medicines as told by your health care providers.  Most people feel some stress when it comes to managing their diabetes. When this stress becomes too much, it is known as diabetes distress. This is very common. Living with diabetes can also place you at risk for depression and anxiety. These disorders can make your condition harder to manage.    How to recognize stress  You may have diabetes distress if:  You avoid or ignore your daily diabetes care. Daily care may include testing your glucose, following a meal plan, and taking medicines.  You feel overwhelmed by what you have to do each day for your care.  You feel anger, sadness, or fear when it comes to your daily care.  You feel fear or shame about not being perfect at doing the things you have been told to do.  Emotional distress    If you have diabetes distress, you may feel:  Anger about having diabetes.  Fear or frustration about your condition and the changes you need to make to manage it.  A lot of worry about the care that you need or the cost of the care that you need.  Like you caused your condition by doing something wrong.  Fear about changes in your blood glucose that you may not be able to predict.  Judged by your health care providers.  Very alone.  Depression    Having diabetes means that you are more at risk for depression. Your health care provider may test (screen) you for symptoms. Symptoms may include:  Loss of interest in things that you used to enjoy.  Feeling depressed much or most of the time.  A change in appetite.  Trouble getting to sleep or staying asleep.  Feeling tired most of the day.  Feeling nervous and anxious.  Feeling guilty and worried that you are a burden to others.  Having thoughts of hurting yourself or feeling that you want to die.  If you have any of these symptoms on more days than not and for 2 weeks or longer, you may have depression. This would be a good time to contact your health care provider.    How to manage diabetes distress  Person sitting at a desk and writing in a notebook.  To manage distress:  Learn as much as you can about your condition and its treatment. Take one step at a time to improve the way you manage your daily care.  Meet with an expert trained in diabetes care (certified diabetes educator). Take a class to learn how to manage your condition.  Consider working with a counselor or therapist.  Keep a journal of your thoughts and concerns.  Accept that some things are out of your control.  Talk with other people who have diabetes. It can help to talk about the distress that you feel.  Find ways to manage stress that work for you. These may include listening to music, art, exercise, meditation, and other hobbies.  Seek support from spiritual leaders, family, and friends.  Follow these instructions at home:  Do your best to follow your plan for how to manage your diabetes.  If you are struggling to follow your plan, talk with a diabetes educator or someone else who has diabetes. They may have ideas that will help.  Forgive yourself for not being perfect. Almost everyone struggles with the tasks of diabetes.  Keep all follow-up visits. Your health care provider will want to monitor your glucose levels. You can also discuss any concerns you have at these visits.  Where to find support  Find support from the American Diabetes Association (ADA): diabetes.org  Find an expert to help you manage your condition. Make an appointment through the Association of Diabetes Care & Education Specialists (ADCES): diabeteseducator.org  Contact a health care provider if:  You believe your diabetes is getting out of control.  You may be depressed.  Your medicines are not helping control your diabetes.  You feel overwhelmed.  Get help right away if:  You have thoughts about hurting yourself or others.  Get help right away if you feel like you may hurt yourself or others, or have thoughts about taking your own life. Go to your nearest emergency room or:  Call 911.  Call the National Suicide Prevention Lifeline at 1-344.157.4935 or 732. This is open 24 hours a day.  Text the Crisis Text Line at 015303.  This information is not intended to replace advice given to you by your health care provider. Make sure you discuss any questions you have with your health care provider.

## 2025-07-19 NOTE — ED PROVIDER NOTE - PHYSICAL EXAMINATION
GEN - NAD; well appearing; A+O x3   HEAD - NC/AT   ENT: Airway patent, mmm  NECK: Neck supple  PULMONARY - Non labored breathing  ABD: S NT  EXTREMITIES - moving all four extremities  NEUROLOGIC - alert, speech clear, normal gait  PSYCH -nl mood/affect, nl insight.

## 2025-07-19 NOTE — ED PROVIDER NOTE - OBJECTIVE STATEMENT
69y male with past medical history of T2DM, HTN presenting to ED with complaint of hypoglycemia due to freestyle shashi alerts after new monitor was applied 11pm yesterday, and after patient arrived to the ED when POCT fingerstick this morning pt was hyperglycemic with blood glucose of 361. Pt was awoken at home from sleep by the low glucose alerts from his monitor from sleep and has been consuming apple juice, lozenges, crackers ever since, as the low glucose alerts have not stopped showing between 50-60 glucose. Pt is typically compliant with medications but due to low glucose monitor reading he has not taken any of his medication, including insulin which is normally taken if glucose is 150+, since yesterday. pt reports his glucose is typically  in the morning before eating and 130's throughout the day. Pt reported blurry vision for the past 2 months but says that it has not gotten worse and is being followed by his PCP and endocrinologist.  Pt denies any chest pain, abdominal pain, polyuria, dysuria, changes in bowel habits, dizziness, fatigue, weakness, dyspnea, SOB, paresthesia, headache, diaphoresis. 69y male with past medical history of T2DM, HTN, DM, currently on Mounjaro, presenting to ED with complaint of hypoglycemia due to freestyle shashi alerts after new monitor was applied 11pm yesterday, and after patient arrived to the ED when POCT fingerstick this morning pt was hyperglycemic with blood glucose of 361. Pt was awoken at home from sleep by the low glucose alerts from his monitor from sleep and has been consuming apple juice, lozenges, crackers ever since, as the low glucose alerts have not stopped showing between 50-60 glucose. Pt is typically compliant with medications but due to low glucose monitor reading he has not taken any of his medication, including insulin which is normally taken if glucose is 150+, since yesterday. pt reports his glucose is typically  in the morning before eating and 130's throughout the day. Pt reported blurry vision for the past 2 months but says that it has not gotten worse and is being followed by his PCP and endocrinologist.  Pt denies any chest pain, abdominal pain, polyuria, dysuria, changes in bowel habits, dizziness, fatigue, weakness, dyspnea, SOB, paresthesia, headache, diaphoresis.